# Patient Record
Sex: MALE | Race: WHITE | NOT HISPANIC OR LATINO | Employment: OTHER | URBAN - METROPOLITAN AREA
[De-identification: names, ages, dates, MRNs, and addresses within clinical notes are randomized per-mention and may not be internally consistent; named-entity substitution may affect disease eponyms.]

---

## 2022-04-14 ENCOUNTER — HOSPITAL ENCOUNTER (INPATIENT)
Facility: HOSPITAL | Age: 72
LOS: 2 days | Discharge: HOME | End: 2022-04-16
Attending: EMERGENCY MEDICINE | Admitting: INTERNAL MEDICINE

## 2022-04-14 ENCOUNTER — APPOINTMENT (OUTPATIENT)
Dept: GENERAL RADIOLOGY | Facility: HOSPITAL | Age: 72
End: 2022-04-14

## 2022-04-14 DIAGNOSIS — R06.02 SHORTNESS OF BREATH: ICD-10-CM

## 2022-04-14 DIAGNOSIS — I44.2 COMPLETE HEART BLOCK: Primary | ICD-10-CM

## 2022-04-14 DIAGNOSIS — I50.9 ACUTE CONGESTIVE HEART FAILURE, UNSPECIFIED HEART FAILURE TYPE: ICD-10-CM

## 2022-04-14 DIAGNOSIS — I44.2 THIRD DEGREE HEART BLOCK: ICD-10-CM

## 2022-04-14 DIAGNOSIS — I16.0 HYPERTENSIVE URGENCY: ICD-10-CM

## 2022-04-14 LAB
ALBUMIN SERPL-MCNC: 4.4 G/DL (ref 3.5–5.2)
ALBUMIN/GLOB SERPL: 1.9 G/DL
ALP SERPL-CCNC: 65 U/L (ref 39–117)
ALT SERPL W P-5'-P-CCNC: 25 U/L (ref 1–41)
ANION GAP SERPL CALCULATED.3IONS-SCNC: 11.6 MMOL/L (ref 5–15)
AST SERPL-CCNC: 18 U/L (ref 1–40)
BACTERIA UR QL AUTO: NORMAL /HPF
BASOPHILS # BLD AUTO: 0.06 10*3/MM3 (ref 0–0.2)
BASOPHILS NFR BLD AUTO: 0.6 % (ref 0–1.5)
BILIRUB SERPL-MCNC: 0.6 MG/DL (ref 0–1.2)
BILIRUB UR QL STRIP: NEGATIVE
BUN SERPL-MCNC: 25 MG/DL (ref 8–23)
BUN/CREAT SERPL: 20.7 (ref 7–25)
CALCIUM SPEC-SCNC: 9.6 MG/DL (ref 8.6–10.5)
CHLORIDE SERPL-SCNC: 102 MMOL/L (ref 98–107)
CLARITY UR: CLEAR
CO2 SERPL-SCNC: 21.4 MMOL/L (ref 22–29)
COLOR UR: YELLOW
CREAT SERPL-MCNC: 1.21 MG/DL (ref 0.76–1.27)
DEPRECATED RDW RBC AUTO: 38.7 FL (ref 37–54)
EGFRCR SERPLBLD CKD-EPI 2021: 63.6 ML/MIN/1.73
EOSINOPHIL # BLD AUTO: 0.21 10*3/MM3 (ref 0–0.4)
EOSINOPHIL NFR BLD AUTO: 2.2 % (ref 0.3–6.2)
ERYTHROCYTE [DISTWIDTH] IN BLOOD BY AUTOMATED COUNT: 12.6 % (ref 12.3–15.4)
GLOBULIN UR ELPH-MCNC: 2.3 GM/DL
GLUCOSE BLDC GLUCOMTR-MCNC: 113 MG/DL (ref 70–130)
GLUCOSE SERPL-MCNC: 112 MG/DL (ref 65–99)
GLUCOSE UR STRIP-MCNC: NEGATIVE MG/DL
HCT VFR BLD AUTO: 36 % (ref 37.5–51)
HGB BLD-MCNC: 12.7 G/DL (ref 13–17.7)
HGB UR QL STRIP.AUTO: NEGATIVE
HYALINE CASTS UR QL AUTO: NORMAL /LPF
IMM GRANULOCYTES # BLD AUTO: 0.02 10*3/MM3 (ref 0–0.05)
IMM GRANULOCYTES NFR BLD AUTO: 0.2 % (ref 0–0.5)
INR PPP: 1.21 (ref 0.9–1.1)
KETONES UR QL STRIP: NEGATIVE
LEUKOCYTE ESTERASE UR QL STRIP.AUTO: ABNORMAL
LYMPHOCYTES # BLD AUTO: 3.14 10*3/MM3 (ref 0.7–3.1)
LYMPHOCYTES NFR BLD AUTO: 32.5 % (ref 19.6–45.3)
MAGNESIUM SERPL-MCNC: 1.8 MG/DL (ref 1.6–2.4)
MCH RBC QN AUTO: 30.9 PG (ref 26.6–33)
MCHC RBC AUTO-ENTMCNC: 35.3 G/DL (ref 31.5–35.7)
MCV RBC AUTO: 87.6 FL (ref 79–97)
MONOCYTES # BLD AUTO: 1.09 10*3/MM3 (ref 0.1–0.9)
MONOCYTES NFR BLD AUTO: 11.3 % (ref 5–12)
NEUTROPHILS NFR BLD AUTO: 5.15 10*3/MM3 (ref 1.7–7)
NEUTROPHILS NFR BLD AUTO: 53.2 % (ref 42.7–76)
NITRITE UR QL STRIP: NEGATIVE
NRBC BLD AUTO-RTO: 0 /100 WBC (ref 0–0.2)
NT-PROBNP SERPL-MCNC: 2432 PG/ML (ref 0–900)
PH UR STRIP.AUTO: 5.5 [PH] (ref 5–8)
PLATELET # BLD AUTO: 234 10*3/MM3 (ref 140–450)
PMV BLD AUTO: 10.6 FL (ref 6–12)
POTASSIUM SERPL-SCNC: 4.2 MMOL/L (ref 3.5–5.2)
PROT SERPL-MCNC: 6.7 G/DL (ref 6–8.5)
PROT UR QL STRIP: NEGATIVE
PROTHROMBIN TIME: 15.2 SECONDS (ref 11.7–14.2)
QT INTERVAL: 657 MS
QT INTERVAL: 680 MS
RBC # BLD AUTO: 4.11 10*6/MM3 (ref 4.14–5.8)
RBC # UR STRIP: NORMAL /HPF
REF LAB TEST METHOD: NORMAL
SARS-COV-2 RNA PNL SPEC NAA+PROBE: NOT DETECTED
SODIUM SERPL-SCNC: 135 MMOL/L (ref 136–145)
SP GR UR STRIP: 1.01 (ref 1–1.03)
SQUAMOUS #/AREA URNS HPF: NORMAL /HPF
TROPONIN T SERPL-MCNC: <0.01 NG/ML (ref 0–0.03)
TSH SERPL DL<=0.05 MIU/L-ACNC: 2.89 UIU/ML (ref 0.27–4.2)
UROBILINOGEN UR QL STRIP: ABNORMAL
WBC # UR STRIP: NORMAL /HPF
WBC NRBC COR # BLD: 9.67 10*3/MM3 (ref 3.4–10.8)

## 2022-04-14 PROCEDURE — 25010000002 FUROSEMIDE PER 20 MG: Performed by: EMERGENCY MEDICINE

## 2022-04-14 PROCEDURE — 83735 ASSAY OF MAGNESIUM: CPT | Performed by: EMERGENCY MEDICINE

## 2022-04-14 PROCEDURE — 33210 INSERT ELECTRD/PM CATH SNGL: CPT | Performed by: INTERNAL MEDICINE

## 2022-04-14 PROCEDURE — 99222 1ST HOSP IP/OBS MODERATE 55: CPT | Performed by: INTERNAL MEDICINE

## 2022-04-14 PROCEDURE — 81001 URINALYSIS AUTO W/SCOPE: CPT | Performed by: EMERGENCY MEDICINE

## 2022-04-14 PROCEDURE — C1894 INTRO/SHEATH, NON-LASER: HCPCS | Performed by: INTERNAL MEDICINE

## 2022-04-14 PROCEDURE — 82962 GLUCOSE BLOOD TEST: CPT

## 2022-04-14 PROCEDURE — 36415 COLL VENOUS BLD VENIPUNCTURE: CPT

## 2022-04-14 PROCEDURE — 85025 COMPLETE CBC W/AUTO DIFF WBC: CPT | Performed by: EMERGENCY MEDICINE

## 2022-04-14 PROCEDURE — 99152 MOD SED SAME PHYS/QHP 5/>YRS: CPT | Performed by: INTERNAL MEDICINE

## 2022-04-14 PROCEDURE — 71045 X-RAY EXAM CHEST 1 VIEW: CPT

## 2022-04-14 PROCEDURE — 80053 COMPREHEN METABOLIC PANEL: CPT | Performed by: EMERGENCY MEDICINE

## 2022-04-14 PROCEDURE — 94799 UNLISTED PULMONARY SVC/PX: CPT

## 2022-04-14 PROCEDURE — 93005 ELECTROCARDIOGRAM TRACING: CPT | Performed by: EMERGENCY MEDICINE

## 2022-04-14 PROCEDURE — 93010 ELECTROCARDIOGRAM REPORT: CPT | Performed by: INTERNAL MEDICINE

## 2022-04-14 PROCEDURE — 25010000002 LORAZEPAM PER 2 MG: Performed by: EMERGENCY MEDICINE

## 2022-04-14 PROCEDURE — 85610 PROTHROMBIN TIME: CPT | Performed by: EMERGENCY MEDICINE

## 2022-04-14 PROCEDURE — 87635 SARS-COV-2 COVID-19 AMP PRB: CPT | Performed by: EMERGENCY MEDICINE

## 2022-04-14 PROCEDURE — 25010000002 ATROPINE SULFATE: Performed by: EMERGENCY MEDICINE

## 2022-04-14 PROCEDURE — 25010000002 HYDRALAZINE PER 20 MG: Performed by: EMERGENCY MEDICINE

## 2022-04-14 PROCEDURE — 84443 ASSAY THYROID STIM HORMONE: CPT | Performed by: EMERGENCY MEDICINE

## 2022-04-14 PROCEDURE — 25010000002 FENTANYL CITRATE (PF) 50 MCG/ML SOLUTION: Performed by: INTERNAL MEDICINE

## 2022-04-14 PROCEDURE — 99291 CRITICAL CARE FIRST HOUR: CPT

## 2022-04-14 PROCEDURE — 84484 ASSAY OF TROPONIN QUANT: CPT | Performed by: EMERGENCY MEDICINE

## 2022-04-14 PROCEDURE — 25010000002 MIDAZOLAM PER 1 MG: Performed by: INTERNAL MEDICINE

## 2022-04-14 PROCEDURE — 94660 CPAP INITIATION&MGMT: CPT

## 2022-04-14 PROCEDURE — 83880 ASSAY OF NATRIURETIC PEPTIDE: CPT | Performed by: EMERGENCY MEDICINE

## 2022-04-14 RX ORDER — LORAZEPAM 2 MG/ML
1 INJECTION INTRAMUSCULAR ONCE
Status: COMPLETED | OUTPATIENT
Start: 2022-04-14 | End: 2022-04-14

## 2022-04-14 RX ORDER — PROMETHAZINE HYDROCHLORIDE 25 MG/1
12.5 TABLET ORAL EVERY 6 HOURS PRN
Status: DISCONTINUED | OUTPATIENT
Start: 2022-04-14 | End: 2022-04-16 | Stop reason: HOSPADM

## 2022-04-14 RX ORDER — CHOLECALCIFEROL (VITAMIN D3) 125 MCG
5 CAPSULE ORAL NIGHTLY PRN
Status: DISCONTINUED | OUTPATIENT
Start: 2022-04-14 | End: 2022-04-16 | Stop reason: HOSPADM

## 2022-04-14 RX ORDER — ONDANSETRON 4 MG/1
4 TABLET, FILM COATED ORAL EVERY 6 HOURS PRN
Status: DISCONTINUED | OUTPATIENT
Start: 2022-04-14 | End: 2022-04-16 | Stop reason: HOSPADM

## 2022-04-14 RX ORDER — VALSARTAN 160 MG/1
160 TABLET ORAL
Status: DISCONTINUED | OUTPATIENT
Start: 2022-04-14 | End: 2022-04-16 | Stop reason: HOSPADM

## 2022-04-14 RX ORDER — LIDOCAINE HYDROCHLORIDE 20 MG/ML
INJECTION, SOLUTION INFILTRATION; PERINEURAL AS NEEDED
Status: DISCONTINUED | OUTPATIENT
Start: 2022-04-14 | End: 2022-04-14 | Stop reason: HOSPADM

## 2022-04-14 RX ORDER — SODIUM CHLORIDE 9 MG/ML
INJECTION, SOLUTION INTRAVENOUS CONTINUOUS PRN
Status: COMPLETED | OUTPATIENT
Start: 2022-04-14 | End: 2022-04-14

## 2022-04-14 RX ORDER — ACETAMINOPHEN 650 MG/1
650 SUPPOSITORY RECTAL EVERY 4 HOURS PRN
Status: DISCONTINUED | OUTPATIENT
Start: 2022-04-14 | End: 2022-04-16 | Stop reason: HOSPADM

## 2022-04-14 RX ORDER — MAGNESIUM SULFATE HEPTAHYDRATE 40 MG/ML
2 INJECTION, SOLUTION INTRAVENOUS AS NEEDED
Status: DISCONTINUED | OUTPATIENT
Start: 2022-04-14 | End: 2022-04-16 | Stop reason: HOSPADM

## 2022-04-14 RX ORDER — CALCIUM GLUCONATE 20 MG/ML
1 INJECTION, SOLUTION INTRAVENOUS AS NEEDED
Status: DISCONTINUED | OUTPATIENT
Start: 2022-04-14 | End: 2022-04-16 | Stop reason: HOSPADM

## 2022-04-14 RX ORDER — SODIUM CHLORIDE 0.9 % (FLUSH) 0.9 %
10 SYRINGE (ML) INJECTION EVERY 12 HOURS SCHEDULED
Status: DISCONTINUED | OUTPATIENT
Start: 2022-04-14 | End: 2022-04-16 | Stop reason: HOSPADM

## 2022-04-14 RX ORDER — NICOTINE POLACRILEX 4 MG
15 LOZENGE BUCCAL
Status: DISCONTINUED | OUTPATIENT
Start: 2022-04-14 | End: 2022-04-16 | Stop reason: HOSPADM

## 2022-04-14 RX ORDER — FUROSEMIDE 10 MG/ML
80 INJECTION INTRAMUSCULAR; INTRAVENOUS ONCE
Status: COMPLETED | OUTPATIENT
Start: 2022-04-14 | End: 2022-04-14

## 2022-04-14 RX ORDER — SODIUM CHLORIDE 0.9 % (FLUSH) 0.9 %
10 SYRINGE (ML) INJECTION AS NEEDED
Status: DISCONTINUED | OUTPATIENT
Start: 2022-04-14 | End: 2022-04-16 | Stop reason: HOSPADM

## 2022-04-14 RX ORDER — POTASSIUM CHLORIDE 750 MG/1
40 TABLET, FILM COATED, EXTENDED RELEASE ORAL AS NEEDED
Status: DISCONTINUED | OUTPATIENT
Start: 2022-04-14 | End: 2022-04-16 | Stop reason: HOSPADM

## 2022-04-14 RX ORDER — ONDANSETRON 2 MG/ML
4 INJECTION INTRAMUSCULAR; INTRAVENOUS EVERY 6 HOURS PRN
Status: DISCONTINUED | OUTPATIENT
Start: 2022-04-14 | End: 2022-04-16 | Stop reason: HOSPADM

## 2022-04-14 RX ORDER — ACETAMINOPHEN 325 MG/1
650 TABLET ORAL EVERY 4 HOURS PRN
Status: DISCONTINUED | OUTPATIENT
Start: 2022-04-14 | End: 2022-04-14 | Stop reason: SDUPTHER

## 2022-04-14 RX ORDER — POTASSIUM CHLORIDE 1.5 G/1.77G
40 POWDER, FOR SOLUTION ORAL AS NEEDED
Status: DISCONTINUED | OUTPATIENT
Start: 2022-04-14 | End: 2022-04-16 | Stop reason: HOSPADM

## 2022-04-14 RX ORDER — ACETAMINOPHEN 325 MG/1
650 TABLET ORAL EVERY 4 HOURS PRN
Status: DISCONTINUED | OUTPATIENT
Start: 2022-04-14 | End: 2022-04-16 | Stop reason: HOSPADM

## 2022-04-14 RX ORDER — DEXTROSE MONOHYDRATE 25 G/50ML
25 INJECTION, SOLUTION INTRAVENOUS
Status: DISCONTINUED | OUTPATIENT
Start: 2022-04-14 | End: 2022-04-16 | Stop reason: HOSPADM

## 2022-04-14 RX ORDER — FENTANYL CITRATE 50 UG/ML
INJECTION, SOLUTION INTRAMUSCULAR; INTRAVENOUS AS NEEDED
Status: DISCONTINUED | OUTPATIENT
Start: 2022-04-14 | End: 2022-04-14 | Stop reason: HOSPADM

## 2022-04-14 RX ORDER — HYDROCODONE BITARTRATE AND ACETAMINOPHEN 5; 325 MG/1; MG/1
1 TABLET ORAL EVERY 4 HOURS PRN
Status: DISCONTINUED | OUTPATIENT
Start: 2022-04-14 | End: 2022-04-15 | Stop reason: SDUPTHER

## 2022-04-14 RX ORDER — MAGNESIUM SULFATE HEPTAHYDRATE 40 MG/ML
4 INJECTION, SOLUTION INTRAVENOUS AS NEEDED
Status: DISCONTINUED | OUTPATIENT
Start: 2022-04-14 | End: 2022-04-16 | Stop reason: HOSPADM

## 2022-04-14 RX ORDER — AMLODIPINE BESYLATE 5 MG/1
5 TABLET ORAL
Status: DISCONTINUED | OUTPATIENT
Start: 2022-04-14 | End: 2022-04-16 | Stop reason: HOSPADM

## 2022-04-14 RX ORDER — POTASSIUM CHLORIDE 7.45 MG/ML
10 INJECTION INTRAVENOUS
Status: DISCONTINUED | OUTPATIENT
Start: 2022-04-14 | End: 2022-04-16 | Stop reason: HOSPADM

## 2022-04-14 RX ORDER — MIDAZOLAM HYDROCHLORIDE 1 MG/ML
INJECTION INTRAMUSCULAR; INTRAVENOUS AS NEEDED
Status: DISCONTINUED | OUTPATIENT
Start: 2022-04-14 | End: 2022-04-14 | Stop reason: HOSPADM

## 2022-04-14 RX ORDER — HYDRALAZINE HYDROCHLORIDE 20 MG/ML
20 INJECTION INTRAMUSCULAR; INTRAVENOUS ONCE
Status: COMPLETED | OUTPATIENT
Start: 2022-04-14 | End: 2022-04-14

## 2022-04-14 RX ADMIN — ATROPINE SULFATE 0.5 MG: 0.1 INJECTION INTRAVENOUS at 17:04

## 2022-04-14 RX ADMIN — FUROSEMIDE 80 MG: 10 INJECTION, SOLUTION INTRAMUSCULAR; INTRAVENOUS at 18:46

## 2022-04-14 RX ADMIN — LORAZEPAM 1 MG: 2 INJECTION INTRAMUSCULAR; INTRAVENOUS at 18:46

## 2022-04-14 RX ADMIN — Medication 10 ML: at 23:37

## 2022-04-14 RX ADMIN — HYDRALAZINE HYDROCHLORIDE 20 MG: 20 INJECTION INTRAMUSCULAR; INTRAVENOUS at 17:59

## 2022-04-14 NOTE — ED PROVIDER NOTES
EMERGENCY DEPARTMENT ENCOUNTER    Room Number:  Pool/CATH  Date of encounter:  4/14/2022  PCP: Felecia Card PA  Historian: Patient and EMS      HPI:  Chief Complaint: Short of breath        Context: Reginald Roth is a 72 y.o. male who presents to the ED c/o short of breath and low heart rate.  Patient states he was admitted to [facility] at the end of January for dizziness and had a negative work-up at that time including a stress test and CT of his chest.  He states that he was seen by pulmonary and diagnosed with obstructive sleep apnea in February.  He states over the past 10 days he has had progressive swelling in his legs and shortness of breath so went to see his PCP in [facility] today who noted him to have a heart rate of 30 and thus called 911 and the patient was brought to [facility] for further evaluation.  Upon arrival the patient's main complaint is shortness of breath.  He denies chest pain, palpitations or dizziness.  The patient is unsure of what medications he takes.  He denies prior cardiac history.  He states he is a non-smoker and has no history of COPD that he is aware of.      PAST MEDICAL HISTORY  Active Ambulatory Problems     Diagnosis Date Noted   • No Active Ambulatory Problems     Resolved Ambulatory Problems     Diagnosis Date Noted   • No Resolved Ambulatory Problems     No Additional Past Medical History         PAST SURGICAL HISTORY  No past surgical history on file.      FAMILY HISTORY  No family history on file.      SOCIAL HISTORY  Social History     Socioeconomic History   • Marital status:          ALLERGIES  Patient has no allergy information on record.        REVIEW OF SYSTEMS  Review of Systems     Patient denies headache, neck pain, chest pain, back pain, abdominal pain, vomiting, diarrhea or focal neuro deficit  All systems reviewed and negative except for those discussed in HPI.     PHYSICAL EXAM    I have reviewed the triage vital signs and nursing  notes.    ED Triage Vitals   Temp Heart Rate Resp BP SpO2   04/14/22 1703 04/14/22 1654 04/14/22 1702 04/14/22 1654 04/14/22 1654   98.4 °F (36.9 °C) (!) 38 22 (!) 193/68 90 %      Temp src Heart Rate Source Patient Position BP Location FiO2 (%)   -- -- -- -- --              GENERAL: 72-year-old well developed, well nourished in mild respiratory distress  HENT: NCAT, neck supple, trachea midline  EYES: no scleral icterus, PERRL, normal conjunctivae  CV: regular rhythm, with a heart rate in the 30s and 2/6 murmur  RESPIRATORY: Mild respiratory distress with Rales in bases  ABDOMEN: soft, nontender, nondistended, bowel sounds present  MUSCULOSKELETAL: no gross deformity, 2+ pedal edema, no calf tenderness  NEURO: alert,  sensory and motor function of extremities intact, speech clear, mental status normal  SKIN: warm, dry, no rash  PSYCH:  Appropriate mood and affect      PPE  Pt does not present with symptoms for COVID19; however, I was wearing a N95 mask and goggles throughout all patient interaction.    Vital signs and nursing notes reviewed.      LAB RESULTS  Recent Results (from the past 24 hour(s))   ECG 12 Lead    Collection Time: 04/14/22  4:56 PM   Result Value Ref Range    QT Interval 657 ms   Comprehensive Metabolic Panel    Collection Time: 04/14/22  5:05 PM    Specimen: Blood   Result Value Ref Range    Glucose 112 (H) 65 - 99 mg/dL    BUN 25 (H) 8 - 23 mg/dL    Creatinine 1.21 0.76 - 1.27 mg/dL    Sodium 135 (L) 136 - 145 mmol/L    Potassium 4.2 3.5 - 5.2 mmol/L    Chloride 102 98 - 107 mmol/L    CO2 21.4 (L) 22.0 - 29.0 mmol/L    Calcium 9.6 8.6 - 10.5 mg/dL    Total Protein 6.7 6.0 - 8.5 g/dL    Albumin 4.40 3.50 - 5.20 g/dL    ALT (SGPT) 25 1 - 41 U/L    AST (SGOT) 18 1 - 40 U/L    Alkaline Phosphatase 65 39 - 117 U/L    Total Bilirubin 0.6 0.0 - 1.2 mg/dL    Globulin 2.3 gm/dL    A/G Ratio 1.9 g/dL    BUN/Creatinine Ratio 20.7 7.0 - 25.0    Anion Gap 11.6 5.0 - 15.0 mmol/L    eGFR 63.6 >60.0  mL/min/1.73   Protime-INR    Collection Time: 04/14/22  5:05 PM    Specimen: Blood   Result Value Ref Range    Protime 15.2 (H) 11.7 - 14.2 Seconds    INR 1.21 (H) 0.90 - 1.10   BNP    Collection Time: 04/14/22  5:05 PM    Specimen: Blood   Result Value Ref Range    proBNP 2,432.0 (H) 0.0 - 900.0 pg/mL   Troponin    Collection Time: 04/14/22  5:05 PM    Specimen: Blood   Result Value Ref Range    Troponin T <0.010 0.000 - 0.030 ng/mL   Magnesium    Collection Time: 04/14/22  5:05 PM    Specimen: Blood   Result Value Ref Range    Magnesium 1.8 1.6 - 2.4 mg/dL   TSH    Collection Time: 04/14/22  5:05 PM    Specimen: Blood   Result Value Ref Range    TSH 2.890 0.270 - 4.200 uIU/mL   CBC Auto Differential    Collection Time: 04/14/22  5:05 PM    Specimen: Blood   Result Value Ref Range    WBC 9.67 3.40 - 10.80 10*3/mm3    RBC 4.11 (L) 4.14 - 5.80 10*6/mm3    Hemoglobin 12.7 (L) 13.0 - 17.7 g/dL    Hematocrit 36.0 (L) 37.5 - 51.0 %    MCV 87.6 79.0 - 97.0 fL    MCH 30.9 26.6 - 33.0 pg    MCHC 35.3 31.5 - 35.7 g/dL    RDW 12.6 12.3 - 15.4 %    RDW-SD 38.7 37.0 - 54.0 fl    MPV 10.6 6.0 - 12.0 fL    Platelets 234 140 - 450 10*3/mm3    Neutrophil % 53.2 42.7 - 76.0 %    Lymphocyte % 32.5 19.6 - 45.3 %    Monocyte % 11.3 5.0 - 12.0 %    Eosinophil % 2.2 0.3 - 6.2 %    Basophil % 0.6 0.0 - 1.5 %    Immature Grans % 0.2 0.0 - 0.5 %    Neutrophils, Absolute 5.15 1.70 - 7.00 10*3/mm3    Lymphocytes, Absolute 3.14 (H) 0.70 - 3.10 10*3/mm3    Monocytes, Absolute 1.09 (H) 0.10 - 0.90 10*3/mm3    Eosinophils, Absolute 0.21 0.00 - 0.40 10*3/mm3    Basophils, Absolute 0.06 0.00 - 0.20 10*3/mm3    Immature Grans, Absolute 0.02 0.00 - 0.05 10*3/mm3    nRBC 0.0 0.0 - 0.2 /100 WBC   COVID-19,[facility] IN-HOUSE CEPHEID/FLORENCIO NP SWAB IN TRANSPORT MEDIA 8-12 HR TAT - Swab, Nasopharynx    Collection Time: 04/14/22  5:06 PM    Specimen: Nasopharynx; Swab   Result Value Ref Range    COVID19 Not Detected Not Detected - Ref. Range   Urinalysis With  Microscopic If Indicated (No Culture) - Urine, Clean Catch    Collection Time: 04/14/22  5:12 PM    Specimen: Urine, Clean Catch   Result Value Ref Range    Color, UA Yellow Yellow, Straw    Appearance, UA Clear Clear    pH, UA 5.5 5.0 - 8.0    Specific Gravity, UA 1.006 1.005 - 1.030    Glucose, UA Negative Negative    Ketones, UA Negative Negative    Bilirubin, UA Negative Negative    Blood, UA Negative Negative    Protein, UA Negative Negative    Leuk Esterase, UA Trace (A) Negative    Nitrite, UA Negative Negative    Urobilinogen, UA 0.2 E.U./dL 0.2 - 1.0 E.U./dL   Urinalysis, Microscopic Only - Urine, Clean Catch    Collection Time: 04/14/22  5:12 PM    Specimen: Urine, Clean Catch   Result Value Ref Range    RBC, UA 0-2 None Seen, 0-2 /HPF    WBC, UA 0-2 None Seen, 0-2 /HPF    Bacteria, UA None Seen None Seen /HPF    Squamous Epithelial Cells, UA 0-2 None Seen, 0-2 /HPF    Hyaline Casts, UA None Seen None Seen /LPF    Methodology Automated Microscopy    ECG 12 Lead    Collection Time: 04/14/22  6:35 PM   Result Value Ref Range    QT Interval 680 ms       Ordered the above labs and independently reviewed the results.        RADIOLOGY  XR Chest 1 View    Result Date: 4/14/2022  PORTABLE CHEST 04/14/2022 AT 5:24 PM  CLINICAL HISTORY: Dyspnea. Bradycardia.  The lungs are somewhat poorly inflated. There is ill-defined increased density in both lower lung zones that is primarily due to the low lung volumes and overlying soft tissue. However, mild pulmonary vascular congestion is also evident.. No focal areas of consolidation are identified. No pleural effusions are evident. The heart is within normal limits in size.  IMPRESSIONS: Poor lung expansion. Mild vascular congestion.  This report was finalized on 4/14/2022 5:58 PM by Dr. Bud Cortez M.D.        I ordered the above noted radiological studies. Independently reviewed by me and discussed with radiologist.  See dictation above for official radiology  interpretation.      PROCEDURES    Critical Care  Performed by: Mele Watt MD  Authorized by: Mele Watt MD     Critical care provider statement:     Critical care time (minutes):  55    Critical care time was exclusive of:  Separately billable procedures and treating other patients    Critical care was necessary to treat or prevent imminent or life-threatening deterioration of the following conditions:  Cardiac failure, circulatory failure and respiratory failure    Critical care was time spent personally by me on the following activities:  Development of treatment plan with patient or surrogate, discussions with consultants, discussions with primary provider, evaluation of patient's response to treatment, examination of patient, obtaining history from patient or surrogate, ordering and performing treatments and interventions, ordering and review of laboratory studies, ordering and review of radiographic studies, pulse oximetry, re-evaluation of patient's condition and review of old charts    I assumed direction of critical care for this patient from another provider in my specialty: no              MEDICATIONS GIVEN IN ER    Medications   sodium chloride 0.9 % flush 10 mL ( Intravenous MAR Hold 4/14/22 1935)   atropine sulfate injection 0.5 mg (0.5 mg Intravenous Given 4/14/22 1704)   hydrALAZINE (APRESOLINE) injection 20 mg (20 mg Intravenous Given 4/14/22 1759)   furosemide (LASIX) injection 80 mg (80 mg Intravenous Given 4/14/22 1846)   LORazepam (ATIVAN) injection 1 mg (1 mg Intravenous Given 4/14/22 1846)         PROGRESS, DATA ANALYSIS, CONSULTS, AND MEDICAL DECISION MAKING    All labs have been independently reviewed by me.  All radiology studies have been reviewed by me and discussed with radiologist dictating report.   EKG's independently reviewed by me.  Discussion below represents my analysis of pertinent findings related to patient's condition, differential diagnosis, treatment plan and  final disposition.      ED Course as of 04/14/22 1938   u Apr 14, 2022 1709 The patient presents to our ER via EMS in third-degree heart block but has a stable blood pressure and is alert and mentating at this time.  His primary complaint is shortness of breath and his room air oxygen saturations are 90% and thus will place him on 2 L.  I will give the patient a dose of atropine and consult cardiology in regards the patient's third-degree heart block.  We have placed the pacing pads on the patient [GP]   1710 EKG    EKG time: 1656  Rhythm/Rate: Third-degree heart block with a rate of 33  Right bundle branch block  No Acute Ischemia  Non-Specific ST-T changes  No old EKG for comparison    Interpreted Contemporaneously by me.  Independently viewed by me     [GP]   1721 I discussed the case with Dr. Munoz from cardiology.  He will consult the interventional cardiologist about pacemaker placement.  He has asked me to give the patient a dose of 20 mg of hydralazine for his blood pressure. [GP]   1805 My interpretation the patient's chest x-ray is CHF.  I will give him a dose of Lasix.  The patient's electrolytes and troponin are negative.  I will call Dr. Munoz back. [GP]   1818 On repeat examination the patient's blood pressure is 168/75, his heart rate is 36 and his oxygen saturations on 3 L are 93%. [GP]   1827 I discussed the case with Dr. Munoz again who has spoken with Dr. Calles who will come and see the patient in the emergency room. [GP]   1838 The patient is now starting to feel dizzy.  His blood pressure is 163/88 and his heart rate is 36.  Dr. Calles is currently in the room with the patient.  A repeat EKG was performed which was shown to Dr. Calles and unchanged from earlier today [GP]   1853 I have ordered the patient Lasix and Ativan and obtained a repeat EKG.  Dr. Calles has called in the Cath Lab in order to place a pacemaker emergently. [GP]      ED Course User Index  [GP] Mele Watt MD            The differential diagnosis includes but is not limited to premature ventricular contractions, premature atrial contractions, supraventricular tachycardia, atrial fibrillation, atrial flutter, or sinus arrhythmia.        AS OF 19:38 EDT VITALS:    BP - 170/59  HR - (!) 39  TEMP - 98.4 °F (36.9 °C)  02 SATS - 95%        DIAGNOSIS  Final diagnoses:   Third degree heart block (HCC)   Acute congestive heart failure, unspecified heart failure type (HCC)   Shortness of breath   Hypertensive urgency         DISPOSITION  Emergently to the Cath Lab        EMR Dragon/Transcription disclaimer:   Much of this encounter note is an electronic transcription/translation of spoken language to printed text.        Mele Watt MD  04/14/22 1939

## 2022-04-14 NOTE — H&P
[facility] History And Physical Assessment    Patient Name: Reginald Roth  Age/Sex: 72 y.o. male  : 1950  MRN: [id number]    Date of Hospital Admission: 2022  Date of Encounter Visit: 22  Encounter Provider: Madelaine Calles MD  Place of Service: [facility] CARDIOLOGY  Patient Care Team:  Felecia Card PA as PCP - General (Internal Medicine)    Subjective:     Chief Complaint:  Dyspnea, lightheadedness, near-syncope    History of Present Illness:  Reginald Roth is a 72 y.o. male with hypertension, hyperlipidemia, diabetes mellitus type 2, obesity, obstructive sleep apnea on CPAP, who presents with complete heart block.    The patient presented to the emergency room with complaints of shortness of breath, dizziness and low heart rate.  He apparently presented with similar complaints of shortness of breath dizziness, near-syncope and syncope back in 2022.  He was in [facility] at that time.  His work-up included a CT of the chest that was unremarkable.  He underwent a cardiac work-up with an echocardiogram and a stress test which were both unremarkable.  Since then he has been diagnosed with obstructive sleep apnea and started on CPAP.    Over the last 10 days he reported increasing dyspnea on exertion along with lower extremity edema.  He was evaluated by his primary care physician earlier today and was noted to have heart rates in the 30s.  EMS was called and he was brought to [facility] [place] operated by [facility] for further evaluation.  He reports shortness of breath both with activity and at rest.  On his arrival he was noted to be in complete heart block with heart rates in the 30s.  Blood pressures have been elevated with systolics in the 160s to 170s.  He was satting in the low 90s on arrival and was placed on 2 L nasal cannula.  Initially he appeared to be tolerating the complete heart block very well but then began complaining of worsening  shortness of breath, lightheadedness, and feeling like he cannot really focus.  He otherwise denies any chest discomfort.  He had some near-syncope yesterday but no syncope recently.    The remainder of his work-up was significant for an elevated BNP of 2432, mildly low sodium of 135, and hemoglobin of 12.7.  His troponin, remainder of electrolytes and TSH are normal.  Chest x-ray does show evidence of increased pulmonary vascular congestion.  COVID-19 test is negative.    Past Medical History:  1.  Diabetes mellitus type 2  2.  Hypertension  3.  Hyperlipidemia  4.  Obstructive sleep apnea compliant with CPAP  5.  Obesity    Past surgical history  Right shoulder replacement  Left rotator cuff repair  Colon surgery    Home Medications (obtained from last admission at [facility] in January):   Amlodipine 5 mg daily  Atorvastatin 20 mg daily  Finasteride 5 mg daily  Hydrochlorothiazide 25 mg daily  Valsartan 160 mg daily    Allergies:  No known drug allergies    Past Social History:  Social History     Socioeconomic History   • Marital status:    No tobacco use    Past Family History:  No relevant family history    Review of Systems:   All systems reviewed. Pertinent positives identified in HPI. All other systems are negative.    Objective:   Temp:  [98.4 °F (36.9 °C)] 98.4 °F (36.9 °C)  Heart Rate:  [] 39  Resp:  [22] 22  BP: (163-193)/(59-76) 170/59  No intake or output data in the 24 hours ending 04/14/22 1851  Body mass index is 35.58 kg/m².      04/14/22  1702   Weight: 106 kg (234 lb)     Weight change:     Physical Exam:   General Appearance:    Alert, appears unwell   Head:    Normocephalic, without obvious abnormality, atraumatic   Eyes:            Lids and lashes normal, conjunctivae and sclerae normal, no   icterus, no pallor, corneas clear, PERRLA   Ears:    Ears appear intact with no abnormalities noted   Neck:   No adenopathy, supple, trachea midline, no thyromegaly, no   carotid bruit, no JVD    Lungs:     Clear to auscultation,respirations regular, even and unlabored    Heart:    Jovanni, regular rhythm and normal rate, normal S1 and S2, no murmur, no gallop, no rub, no click   Chest Wall:    No abnormalities observed   Abdomen:     Normal bowel sounds, no masses, no organomegaly, soft        non-tender, non-distended, no guarding, no rebound  tenderness   Extremities:   Moves all extremities well, no edema, no cyanosis, no redness   Pulses:   Pulses palpable and equal bilaterally. Normal radial, carotid, femoral, dorsalis pedis and posterior tibial pulses bilaterally. Normal abdominal aorta   Skin:  Psychiatric:   No bleeding, bruising or rash    Alert and oriented x 3, normal mood and affect         Lab Review:   Results from last 7 days   Lab Units 04/14/22  1705   SODIUM mmol/L 135*   POTASSIUM mmol/L 4.2   CHLORIDE mmol/L 102   CO2 mmol/L 21.4*   BUN mg/dL 25*   CREATININE mg/dL 1.21   GLUCOSE mg/dL 112*   CALCIUM mg/dL 9.6   AST (SGOT) U/L 18   ALT (SGPT) U/L 25     Results from last 7 days   Lab Units 04/14/22  1705   TROPONIN T ng/mL <0.010     Results from last 7 days   Lab Units 04/14/22  1705   WBC 10*3/mm3 9.67   HEMOGLOBIN g/dL 12.7*   HEMATOCRIT % 36.0*   PLATELETS 10*3/mm3 234     Results from last 7 days   Lab Units 04/14/22  1705   INR  1.21*     Results from last 7 days   Lab Units 04/14/22  1705   MAGNESIUM mg/dL 1.8           Invalid input(s): LDLCALC  Results from last 7 days   Lab Units 04/14/22  1705   PROBNP pg/mL 2,432.0*     Results from last 7 days   Lab Units 04/14/22  1705   TSH uIU/mL 2.890       Imaging:  Imaging Results (Most Recent)     Procedure Component Value Units Date/Time    XR Chest 1 View [[id number]] Collected: 04/14/22 1757     Updated: 04/14/22 1801    Narrative:      PORTABLE CHEST 04/14/2022 AT 5:24 PM     CLINICAL HISTORY: Dyspnea. Bradycardia.     The lungs are somewhat poorly inflated. There is ill-defined increased  density in both lower lung zones that is  primarily due to the low lung  volumes and overlying soft tissue. However, mild pulmonary vascular  congestion is also evident.. No focal areas of consolidation are  identified. No pleural effusions are evident. The heart is within normal  limits in size.     IMPRESSIONS: Poor lung expansion. Mild vascular congestion.     This report was finalized on 4/14/2022 5:58 PM by Dr. Bud Cortez M.D.           I personally viewed and interpreted the patient's EKG    Assessment/Plan:     1. Complete heart block.  Symptomatic.    2. Acute congestive heart failure. Likely due to #1.  Recent echo at [facility] in 1/2022 showed normal left ventricular systolic function.  Just received an IV dose of furosemide.  Suspect this is related to his complete heart block.  3. Hypertension.  Elevated here likely compensatory due to heart block.   4. Hyperlipidemia  5. Diabetes mellitus type 2    -Agree with IV diuresis now.  -We will proceed with temporary pacer wire placement tonight.  -Monitor overnight in the coronary care unit.  -We will reassess tomorrow for permanent pacemaker placement.    Madelaine Calles MD  04/14/22  18:51 EDT    ADDENDUM:  Temporary pacer wire placed successfully in the cardiac catheterization laboratory.  Patient did develop progressive hypoxia and required nonrebreather placement during the procedure.  He was unable to urinate despite the IV furosemide 80 mg administered prior to arriving to the cardiac catheterization laboratory.  Heath catheter was placed before leaving the catheterization laboratory and he had already put out 500 mL of urine prior to transfer.  Monitor for further response from his furosemide dosage from earlier this evening.  Repeat dosing as needed.  Consult pulmonary to assist with his hypoxic respiratory failure.  Keep temporary wire in place and continue supportive care.  Reevaluate in the morning for permanent pacemaker placement.

## 2022-04-15 LAB
ANION GAP SERPL CALCULATED.3IONS-SCNC: 12.3 MMOL/L (ref 5–15)
BUN SERPL-MCNC: 26 MG/DL (ref 8–23)
BUN/CREAT SERPL: 19.5 (ref 7–25)
CALCIUM SPEC-SCNC: 9.5 MG/DL (ref 8.6–10.5)
CHLORIDE SERPL-SCNC: 103 MMOL/L (ref 98–107)
CO2 SERPL-SCNC: 23.7 MMOL/L (ref 22–29)
CREAT SERPL-MCNC: 1.33 MG/DL (ref 0.76–1.27)
DEPRECATED RDW RBC AUTO: 41 FL (ref 37–54)
EGFRCR SERPLBLD CKD-EPI 2021: 56.8 ML/MIN/1.73
ERYTHROCYTE [DISTWIDTH] IN BLOOD BY AUTOMATED COUNT: 12.6 % (ref 12.3–15.4)
GLUCOSE BLDC GLUCOMTR-MCNC: 126 MG/DL (ref 70–130)
GLUCOSE BLDC GLUCOMTR-MCNC: 128 MG/DL (ref 70–130)
GLUCOSE BLDC GLUCOMTR-MCNC: 144 MG/DL (ref 70–130)
GLUCOSE SERPL-MCNC: 135 MG/DL (ref 65–99)
HCT VFR BLD AUTO: 37.2 % (ref 37.5–51)
HGB BLD-MCNC: 12.7 G/DL (ref 13–17.7)
MCH RBC QN AUTO: 30.6 PG (ref 26.6–33)
MCHC RBC AUTO-ENTMCNC: 34.1 G/DL (ref 31.5–35.7)
MCV RBC AUTO: 89.6 FL (ref 79–97)
PLATELET # BLD AUTO: 250 10*3/MM3 (ref 140–450)
PMV BLD AUTO: 10.6 FL (ref 6–12)
POTASSIUM SERPL-SCNC: 3.8 MMOL/L (ref 3.5–5.2)
RBC # BLD AUTO: 4.15 10*6/MM3 (ref 4.14–5.8)
SODIUM SERPL-SCNC: 139 MMOL/L (ref 136–145)
WBC NRBC COR # BLD: 9.29 10*3/MM3 (ref 3.4–10.8)

## 2022-04-15 PROCEDURE — 93005 ELECTROCARDIOGRAM TRACING: CPT | Performed by: INTERNAL MEDICINE

## 2022-04-15 PROCEDURE — 02H63JZ INSERTION OF PACEMAKER LEAD INTO RIGHT ATRIUM, PERCUTANEOUS APPROACH: ICD-10-PCS | Performed by: INTERNAL MEDICINE

## 2022-04-15 PROCEDURE — C1894 INTRO/SHEATH, NON-LASER: HCPCS | Performed by: INTERNAL MEDICINE

## 2022-04-15 PROCEDURE — 99232 SBSQ HOSP IP/OBS MODERATE 35: CPT | Performed by: INTERNAL MEDICINE

## 2022-04-15 PROCEDURE — 93010 ELECTROCARDIOGRAM REPORT: CPT | Performed by: INTERNAL MEDICINE

## 2022-04-15 PROCEDURE — 25010000002 VANCOMYCIN PER 500 MG: Performed by: NURSE PRACTITIONER

## 2022-04-15 PROCEDURE — C1898 LEAD, PMKR, OTHER THAN TRANS: HCPCS | Performed by: INTERNAL MEDICINE

## 2022-04-15 PROCEDURE — C1785 PMKR, DUAL, RATE-RESP: HCPCS | Performed by: INTERNAL MEDICINE

## 2022-04-15 PROCEDURE — 85027 COMPLETE CBC AUTOMATED: CPT | Performed by: INTERNAL MEDICINE

## 2022-04-15 PROCEDURE — 99153 MOD SED SAME PHYS/QHP EA: CPT | Performed by: INTERNAL MEDICINE

## 2022-04-15 PROCEDURE — 80048 BASIC METABOLIC PNL TOTAL CA: CPT | Performed by: INTERNAL MEDICINE

## 2022-04-15 PROCEDURE — 02HK3JZ INSERTION OF PACEMAKER LEAD INTO RIGHT VENTRICLE, PERCUTANEOUS APPROACH: ICD-10-PCS | Performed by: INTERNAL MEDICINE

## 2022-04-15 PROCEDURE — 25010000002 FENTANYL CITRATE (PF) 50 MCG/ML SOLUTION: Performed by: INTERNAL MEDICINE

## 2022-04-15 PROCEDURE — 33208 INSRT HEART PM ATRIAL & VENT: CPT | Performed by: INTERNAL MEDICINE

## 2022-04-15 PROCEDURE — 99152 MOD SED SAME PHYS/QHP 5/>YRS: CPT | Performed by: INTERNAL MEDICINE

## 2022-04-15 PROCEDURE — 0 IOPAMIDOL PER 1 ML: Performed by: INTERNAL MEDICINE

## 2022-04-15 PROCEDURE — 25010000002 VANCOMYCIN PER 500 MG: Performed by: INTERNAL MEDICINE

## 2022-04-15 PROCEDURE — 0JH606Z INSERTION OF PACEMAKER, DUAL CHAMBER INTO CHEST SUBCUTANEOUS TISSUE AND FASCIA, OPEN APPROACH: ICD-10-PCS | Performed by: INTERNAL MEDICINE

## 2022-04-15 PROCEDURE — 25010000002 MIDAZOLAM PER 1 MG: Performed by: INTERNAL MEDICINE

## 2022-04-15 PROCEDURE — 82962 GLUCOSE BLOOD TEST: CPT

## 2022-04-15 PROCEDURE — C1887 CATHETER, GUIDING: HCPCS | Performed by: INTERNAL MEDICINE

## 2022-04-15 PROCEDURE — 99222 1ST HOSP IP/OBS MODERATE 55: CPT

## 2022-04-15 DEVICE — IMPLANTABLE DEVICE: Type: IMPLANTABLE DEVICE | Status: FUNCTIONAL

## 2022-04-15 DEVICE — LD PM CAPSUREFIX NOVUS5076 52CM: Type: IMPLANTABLE DEVICE | Status: FUNCTIONAL

## 2022-04-15 DEVICE — GEN PM AZURE XT SURESCAN DR MRI: Type: IMPLANTABLE DEVICE | Status: FUNCTIONAL

## 2022-04-15 RX ORDER — CARVEDILOL 12.5 MG/1
12.5 TABLET ORAL EVERY 12 HOURS SCHEDULED
Status: DISCONTINUED | OUTPATIENT
Start: 2022-04-15 | End: 2022-04-16 | Stop reason: HOSPADM

## 2022-04-15 RX ORDER — HYDROCODONE BITARTRATE AND ACETAMINOPHEN 5; 325 MG/1; MG/1
1 TABLET ORAL EVERY 4 HOURS PRN
Status: DISCONTINUED | OUTPATIENT
Start: 2022-04-15 | End: 2022-04-16 | Stop reason: HOSPADM

## 2022-04-15 RX ORDER — MIDAZOLAM HYDROCHLORIDE 1 MG/ML
INJECTION INTRAMUSCULAR; INTRAVENOUS AS NEEDED
Status: DISCONTINUED | OUTPATIENT
Start: 2022-04-15 | End: 2022-04-15 | Stop reason: HOSPADM

## 2022-04-15 RX ORDER — VANCOMYCIN HYDROCHLORIDE 1 G/200ML
INJECTION, SOLUTION INTRAVENOUS CONTINUOUS PRN
Status: COMPLETED | OUTPATIENT
Start: 2022-04-15 | End: 2022-04-15

## 2022-04-15 RX ORDER — SODIUM CHLORIDE 0.9 % (FLUSH) 0.9 %
10 SYRINGE (ML) INJECTION AS NEEDED
Status: DISCONTINUED | OUTPATIENT
Start: 2022-04-15 | End: 2022-04-16 | Stop reason: HOSPADM

## 2022-04-15 RX ORDER — ACETAMINOPHEN 650 MG/1
650 SUPPOSITORY RECTAL EVERY 4 HOURS PRN
Status: DISCONTINUED | OUTPATIENT
Start: 2022-04-15 | End: 2022-04-16 | Stop reason: HOSPADM

## 2022-04-15 RX ORDER — ACETAMINOPHEN 325 MG/1
650 TABLET ORAL EVERY 4 HOURS PRN
Status: DISCONTINUED | OUTPATIENT
Start: 2022-04-15 | End: 2022-04-16 | Stop reason: HOSPADM

## 2022-04-15 RX ORDER — LIDOCAINE HYDROCHLORIDE AND EPINEPHRINE 10; 10 MG/ML; UG/ML
INJECTION, SOLUTION INFILTRATION; PERINEURAL AS NEEDED
Status: DISCONTINUED | OUTPATIENT
Start: 2022-04-15 | End: 2022-04-15 | Stop reason: HOSPADM

## 2022-04-15 RX ORDER — FENTANYL CITRATE 50 UG/ML
INJECTION, SOLUTION INTRAMUSCULAR; INTRAVENOUS AS NEEDED
Status: DISCONTINUED | OUTPATIENT
Start: 2022-04-15 | End: 2022-04-15 | Stop reason: HOSPADM

## 2022-04-15 RX ORDER — SODIUM CHLORIDE 0.9 % (FLUSH) 0.9 %
10 SYRINGE (ML) INJECTION EVERY 12 HOURS SCHEDULED
Status: DISCONTINUED | OUTPATIENT
Start: 2022-04-15 | End: 2022-04-16 | Stop reason: HOSPADM

## 2022-04-15 RX ORDER — VANCOMYCIN HYDROCHLORIDE 1 G/200ML
1000 INJECTION, SOLUTION INTRAVENOUS
Status: COMPLETED | OUTPATIENT
Start: 2022-04-15 | End: 2022-04-15

## 2022-04-15 RX ADMIN — HYDROCODONE BITARTRATE AND ACETAMINOPHEN 1 TABLET: 5; 325 TABLET ORAL at 20:08

## 2022-04-15 RX ADMIN — Medication 10 ML: at 20:08

## 2022-04-15 RX ADMIN — VANCOMYCIN HYDROCHLORIDE 1000 MG: 1 INJECTION, SOLUTION INTRAVENOUS at 15:31

## 2022-04-15 RX ADMIN — Medication 10 ML: at 21:10

## 2022-04-15 RX ADMIN — CARVEDILOL 12.5 MG: 12.5 TABLET, FILM COATED ORAL at 21:07

## 2022-04-15 RX ADMIN — AMLODIPINE BESYLATE 5 MG: 5 TABLET ORAL at 08:21

## 2022-04-15 RX ADMIN — Medication 10 ML: at 09:00

## 2022-04-15 RX ADMIN — VALSARTAN 160 MG: 160 TABLET, FILM COATED ORAL at 08:20

## 2022-04-15 NOTE — PLAN OF CARE
Problem: Adult Inpatient Plan of Care  Goal: Plan of Care Review  Outcome: Ongoing, Progressing  Goal: Patient-Specific Goal (Individualized)  Outcome: Ongoing, Progressing  Goal: Absence of Hospital-Acquired Illness or Injury  Outcome: Ongoing, Progressing  Intervention: Identify and Manage Fall Risk  Recent Flowsheet Documentation  Taken 4/15/2022 0000 by Felecia Billingsley RN  Safety Promotion/Fall Prevention: safety round/check completed  Taken 4/14/2022 2339 by Felecia Billingsley RN  Safety Promotion/Fall Prevention: safety round/check completed  Taken 4/14/2022 2213 by Felecia Billingsley RN  Safety Promotion/Fall Prevention: safety round/check completed  Taken 4/14/2022 2100 by Felecia Billingsley RN  Safety Promotion/Fall Prevention: safety round/check completed  Taken 4/14/2022 2045 by Felecia Billingsley RN  Safety Promotion/Fall Prevention: safety round/check completed  Intervention: Prevent Skin Injury  Recent Flowsheet Documentation  Taken 4/15/2022 0000 by Felecia Billingsley RN  Body Position:   position changed independently   right  Taken 4/14/2022 2213 by Felecia Billingsley RN  Body Position:   position changed independently   left  Taken 4/14/2022 2045 by Felecia Billingsley RN  Body Position:   position changed independently   supine  Goal: Optimal Comfort and Wellbeing  Outcome: Ongoing, Progressing  Goal: Readiness for Transition of Care  Outcome: Ongoing, Progressing  Intervention: Mutually Develop Transition Plan  Recent Flowsheet Documentation  Taken 4/15/2022 0403 by Felecia Billingsley RN  Equipment Currently Used at Home:   cpap   bp cuff   cane, straight  Taken 4/14/2022 2342 by Felecia Billingsley RN  Transportation Anticipated: family or friend will provide  Patient/Family Anticipated Services at Transition: none  Patient/Family Anticipates Transition to: home with family   Goal Outcome Evaluation:

## 2022-04-15 NOTE — PROGRESS NOTES
"      [facility]         Dr Carreon Sayied   LOS: 1 day   Patient Care Team:  Felecia Card PA as PCP - General (Internal Medicine)    Chief Complaint: Acute pulmonary edema with acute hypoxemic respiratory failure SHAMIKA complete heart block    Interval History: Events noted chart reviewed.  Currently on nasal cannula oxygen used is CPAP last night.    REVIEW OF SYSTEMS:   CARDIOVASCULAR: No chest pain, chest pressure or chest discomfort. No palpitations or edema.   RESPIRATORY: Short of breath with activity  GASTROINTESTINAL: No anorexia, nausea, vomiting or diarrhea. No abdominal pain or blood.   HEMATOLOGIC: No bleeding or bruising.     Ventilator/Non-Invasive Ventilation Settings (From admission, onward)            None            Vital Signs  Temp:  [98.2 °F (36.8 °C)-98.6 °F (37 °C)] 98.6 °F (37 °C)  Heart Rate:  [] 69  Resp:  [11-22] 12  BP: (138-193)/(59-81) 138/64    Intake/Output Summary (Last 24 hours) at 4/15/2022 0922  Last data filed at 4/15/2022 0600  Gross per 24 hour   Intake 243.5 ml   Output 4000 ml   Net -3756.5 ml     Flowsheet Rows    Flowsheet Row First Filed Value   Admission Height 172.7 cm (68\") Documented at 04/14/2022 1702   Admission Weight 106 kg (234 lb) Documented at 04/14/2022 1702          Physical Exam:  Patient is examined using the personal protective equipment as per guidelines from infection control for this particular patient as enacted.  Hand hygiene was performed before and after patient interaction.   General Appearance:    Alert, cooperative, in no acute distress.  Following simple commands  ENT Mallampati between 3 and 4 no nasal congestion  Neck midline trachea, no thyromegaly   Lungs:    Diminished breath sounds with occasional crackles bilaterally on the basis    Heart:    Regular rhythm and normal rate, normal S1 and S2, no            murmur, no gallop, no rub, no click   Chest Wall:    No abnormalities observed   Abdomen:     Normal bowel sounds, " no masses, no organomegaly, soft        nontender, nondistended, no guarding, no rebound                tenderness   Extremities:   Moves all extremities well, 2+ edema, no cyanosis, no             redness  CNS no focal neurological deficits normal sensory exam  Skin no rashes no nodules  Musculoskeletal no cyanosis no clubbing normal range of motion     Results Review:        Results from last 7 days   Lab Units 04/14/22  1705   SODIUM mmol/L 135*   POTASSIUM mmol/L 4.2   CHLORIDE mmol/L 102   CO2 mmol/L 21.4*   BUN mg/dL 25*   CREATININE mg/dL 1.21   GLUCOSE mg/dL 112*   CALCIUM mg/dL 9.6     Results from last 7 days   Lab Units 04/14/22  1705   TROPONIN T ng/mL <0.010     Results from last 7 days   Lab Units 04/15/22  0857 04/14/22  1705   WBC 10*3/mm3 9.29 9.67   HEMOGLOBIN g/dL 12.7* 12.7*   HEMATOCRIT % 37.2* 36.0*   PLATELETS 10*3/mm3 250 234     Results from last 7 days   Lab Units 04/14/22  1705   INR  1.21*         Results from last 7 days   Lab Units 04/14/22  1705   MAGNESIUM mg/dL 1.8               I reviewed the patient's new clinical results.  I personally viewed and interpreted the patient's chest x-ray.        Medication Review:   amLODIPine, 5 mg, Oral, Q24H  insulin regular, 0-9 Units, Subcutaneous, Q6H  sodium chloride, 10 mL, Intravenous, Q12H  valsartan, 160 mg, Oral, Q24H             ASSESSMENT:   Acute pulmonary edema  Acute hypoxemic respiratory failure  SHAMIKA  Complete heart block  Diabetes mellitus  Hypertension  Obesity  Hyperlipidemia      PLAN:  Clinically much improved with diuresis.  Further diuresis per cardiology  Wean down oxygen to keep sats over 90%  Home CPAP at bedside  Complete heart block per cardiology  Blood glucose monitoring  Mobilize ambulate  No objections to transfer out of the ICU      Lauri Mcmahon MD  04/15/22  09:22 EDT

## 2022-04-15 NOTE — CONSULTS
Electrophysiology Hospital Consult            Patient Name: Reginald Roth  Age/Sex: 72 y.o. male  : 1950  MRN: [id number]    Date of Admission: 2022  Date of Encounter Visit: 04/15/22  Encounter Provider: KAREN Carlin  Referring Provider: No ref. provider found  Place of Service: [facility] CARDIOLOGY  Patient Care Team:  Felecia Card PA as PCP - General (Internal Medicine)      Subjective:   EP Consultation for: Complete AV block     Chief Complaint: dyspnea, near-syncope    History of Present Illness:  Reginald Roth is a 72 y.o. male with a history of hypertension, type II DM, obesity, SHAMIKA (on CPAP).  No significant cardiac history, does not have a primary cardiologist.    He presented to the ED yesterday 2022 with complaints of shortness of breath, dizziness, and near-syncope. According to the patient and provider notes, he had similar symptoms in January and presented to [facility].  He had a cardiac workup which was unremarkable.  He was diagnosed with SHAMIKA and now uses a CPAP.     He saw his PCP earlier on  and was noted to have a heart rate in the 30s.  His heart rate along with continuing symptoms provoked his PCP to recommend that he go to the ED.  Upon arrival he was noted to be in complete heart block with a heart rate of 30.  Dr. Calles saw him and decided to take him to the cath lab for placement of temporary pacing wire.  He was continuously paced overnight.  Dr. Calles saw him this morning and turned down his pacer rate, he is still in 2:1 high degree AV block with a rate of 38bpm.    We have been asked to see him for evaluation of PPM.      Currently V-paced with temporary pacing wire, underlying 2:1 high degree AV block with rate in the 30s.  He feels much better today after temp pacer placement.  He says that he has been having intermittent episodes of shortness of breath, fatigue, and near syncope  since January. Currently on 3L NC with improvement in breathing.    He lives in [place] and currently retired.         Past Medical History:  Past Medical History:   Diagnosis Date   • Hyperlipidemia    • Hypertension    • Prostate disease    • Sleep apnea        Past Surgical History:   Procedure Laterality Date   • COLON SURGERY     • COLONOSCOPY     • JOINT REPLACEMENT         Home Medications:   No medications prior to admission.       Allergies:  Not on File    Past Social History:  Social History     Socioeconomic History   • Marital status:    Tobacco Use   • Smoking status: Never Smoker   • Smokeless tobacco: Never Used       Past Family History:  No family history on file.    Review of Systems: All systems reviewed. Pertinent positives identified in HPI. All other systems are negative.     14 point ROS was performed and is negative except as outlined in HPI.     Objective:     Objective:  Vital Signs (last 24 hours)       04/14 0700  04/15 0659 04/15 0700  04/15 1026   Most Recent      Temp (°F) 98.2 -  98.5      98.6     98.6 (37) 04/15 0700    Heart Rate 34 -  103      69     69 04/15 0900    Resp 11 - 22       12 04/14 2006    /72 -  193/68    138/64 -  170/75     138/64 04/15 0900    SpO2 (%) 86 -  99    93 -  95     93 04/15 0900        Temp:  [98.2 °F (36.8 °C)-98.6 °F (37 °C)] 98.6 °F (37 °C)  Heart Rate:  [] 69  Resp:  [11-22] 12  BP: (138-193)/(59-81) 138/64  Body mass index is 35.4 kg/m².        Physical Exam:     General Appearance: No acute distress, well developed and well nourished.   Eyes: Conjunctiva and lids: No erythema, swelling, or discharge. Sclera non-icteric.   HENT: Atraumatic, normocephalic. External eyes, ears, and nose normal.   Respiratory: No signs of respiratory distress. Respiration rhythm and depth normal.   Clear to auscultation. No rales, crackles, rhonchi, or wheezing auscultated.   Cardiovascular:  Heart Rate and Rhythm: V-paced, Heart Sounds:  Normal S1 and S2. No S3 or S4 noted.  Gastrointestinal:  Abdomen soft, non-distended, non-tender.  Musculoskeletal: Normal movement of extremities  Skin: Warm and dry.   Psychiatric: Patient alert and oriented to person, place, and time. Speech and behavior appropriate. Normal mood and affect.    Labs:   Lab Review:     Results from last 7 days   Lab Units 04/15/22  0857 04/14/22  1705   SODIUM mmol/L 139 135*   POTASSIUM mmol/L 3.8 4.2   CHLORIDE mmol/L 103 102   CO2 mmol/L 23.7 21.4*   BUN mg/dL 26* 25*   CREATININE mg/dL 1.33* 1.21   GLUCOSE mg/dL 135* 112*   CALCIUM mg/dL 9.5 9.6   AST (SGOT) U/L  --  18   ALT (SGPT) U/L  --  25     Results from last 7 days   Lab Units 04/14/22  1705   TROPONIN T ng/mL <0.010     Results from last 7 days   Lab Units 04/15/22  0857   WBC 10*3/mm3 9.29   HEMOGLOBIN g/dL 12.7*   HEMATOCRIT % 37.2*   PLATELETS 10*3/mm3 250     Results from last 7 days   Lab Units 04/14/22  1705   INR  1.21*         Results from last 7 days   Lab Units 04/14/22  1705   MAGNESIUM mg/dL 1.8         Results from last 7 days   Lab Units 04/14/22  1705   PROBNP pg/mL 2,432.0*         Results from last 7 days   Lab Units 04/14/22  1705   TSH uIU/mL 2.890           Imaging:   XR Chest 1 view  The lungs are somewhat poorly inflated. There is ill-defined increased  density in both lower lung zones that is primarily due to the low lung  volumes and overlying soft tissue. However, mild pulmonary vascular  congestion is also evident.. No focal areas of consolidation are  identified. No pleural effusions are evident. The heart is within normal  limits in size.     IMPRESSIONS: Poor lung expansion. Mild vascular congestion.      EKG:         I personally viewed and interpreted the patient's EKG/Telemetry tracings.    Assessment:       Complete heart block (HCC)    Third degree heart block (HCC)        Plan:   1. Complete heart block, high degree AV block- Dr. Calles inserted temporary pacer wire yesterday.  Patient  currently v-paced at 70, underlying rhythm is 2:1 high degree AV block with a rate of 30bpm.      2. Acute congestive heart failure- recent echo at [facility] showed normal LVEF. Patient has received IV diuretics, holding for now.     3. HTN- well controlled this afternoon, was hypertensive on admission.    Dr. Shetty discussed need for permanent pacemaker with the patient due to complete heart block.  Risks and benefits were discussed as well as expectations and follow up.  Patient is agreeable and would like to proceed.     -Orders have been placed, plan for pacemaker this afternoon.    Thank you for allowing me to participate in the care of Reginald Roth. Feel free to contact me directly with any further questions or concerns.    KAREN Carlin  [facility]  04/15/22  10:26 EDT

## 2022-04-15 NOTE — CONSULTS
[facility]    Reason for consult: AHRF    HPI:  Mr. Roth is a 71yo Male who has been having some shortness of breath on exertion and dizziness, weakness off and on for several months.  These episodes seemed to come and go on its own without any specific alleviating or exacerbating factors.  He had prior work up at [facility].  Over the last 10 days he has had increased shortness of breath on exertion and le edea.  He was found to hve hr in the 30's and was sent to ER via EMS>  He was in complete heart block on arrival.  He has been needing as much as 15lpm but has been weaned down now to 1bout 12 and sat are in the high 90's.  He has been given a temporary pacer and diuretic and is feeling improved.  He does have a cpap he uses at home for vanessa.      PMH:  VANESSA  DMII  HTN  HLD  Obesity    MEDS: reviewed as per chart  ALL: NKDA  ROS: 12 point negative except as in HPI    Social History     Socioeconomic History   • Marital status:      No tobacco     FAmily history: not relevant    Vital Sign Min/Max for last 24 hours  Temp  Min: 98.2 °F (36.8 °C)  Max: 98.4 °F (36.9 °C)   BP  Min: 163/63  Max: 193/68   Pulse  Min: 34  Max: 103   Resp  Min: 11  Max: 22   SpO2  Min: 86 %  Max: 96 %   Flow (L/min)  Min: 6  Max: 15   Weight  Min: 106 kg (234 lb)  Max: 106 kg (234 lb)     GEN:   appears ill, obese, AxOx3  HEENT: PERRL, EOMI, normal sclera, mmm, + jvd, trachea midline, neck supple  CHEST: decreased ae bilat, no wheezes, + crackles, no use of accessory muscles  CV: RRR, no m/g/r  ABD: soft, nt, nd +bs, no hepatosplenomegaly  EXT: no c/c/e  SKIN: no rashes, no xanthomas, nl turgor, warm, dry  LYMPH: no palpable cervical or supraclavicular lymphadenopathy  NEURO: CN 2-12 intact and symmetric bilaterally  PSYCH: nl affect, nl orientation, nl judgement, nl mood  MSK: No kyphoscoliosis, 5/5 strength ue nd le bilterally    Labs: 4/14: reviewed:  Glucose 112  Bun 25  Cr 1.2  Na 135  Bicarb 21  probnp  2432  Wbc 9.6  hgb 12.7  plts 234  Trop 0.01  CXR: 3/14: reviewed bilteral infiltates consitsetn with pulm edema    A/P:  1. Acute pulmonary edema --agree with diuresis  2. AHRF --wean oxygen as able,   3. SHAMIKA --home pap  4. Complete heart block --as per cards  5. DMII --ssi  6. HTN   7. HLD  8. Obesity

## 2022-04-15 NOTE — PROGRESS NOTES
[facility] Follow Up    Chief Complaint: Follow up complete heart block    Interval History: Breathing better.  No chest pain.  Down to 3 L nasal cannula.  Remains paced at 70 bpm.  I turned down his pacer rate and it appears that his underlying rhythm is 2:1 AV block with a ventricular rate of 38 bpm.    Objective:     Objective:  Temp:  [98.2 °F (36.8 °C)-98.6 °F (37 °C)] 98.6 °F (37 °C)  Heart Rate:  [] 69  Resp:  [11-22] 12  BP: (156-193)/(59-81) 170/75     Intake/Output Summary (Last 24 hours) at 4/15/2022 0757  Last data filed at 4/15/2022 0600  Gross per 24 hour   Intake 243.5 ml   Output 4000 ml   Net -3756.5 ml     Body mass index is 35.4 kg/m².      04/14/22  1702 04/14/22  2214 04/15/22  0425   Weight: 106 kg (234 lb) 106 kg (232 lb 12.9 oz) 106 kg (232 lb 12.9 oz)     Weight change:       Physical Exam:   General : Alert, cooperative, in no acute distress.  Neuro: Alert,cooperative and oriented.  Lungs: CTAB. Normal respiratory effort and rate.  CV: Regular rate and rhythm, normal S1 and S2, no murmurs, gallops or rubs.  ABD: Soft, nontender, nondistended. Positive bowel sounds.  Extr: No edema or cyanosis, moves all extremities.    Lab Review:   Results from last 7 days   Lab Units 04/14/22  1705   SODIUM mmol/L 135*   POTASSIUM mmol/L 4.2   CHLORIDE mmol/L 102   CO2 mmol/L 21.4*   BUN mg/dL 25*   CREATININE mg/dL 1.21   GLUCOSE mg/dL 112*   CALCIUM mg/dL 9.6   AST (SGOT) U/L 18   ALT (SGPT) U/L 25     Results from last 7 days   Lab Units 04/14/22  1705   TROPONIN T ng/mL <0.010     Results from last 7 days   Lab Units 04/14/22  1705   WBC 10*3/mm3 9.67   HEMOGLOBIN g/dL 12.7*   HEMATOCRIT % 36.0*   PLATELETS 10*3/mm3 234     Results from last 7 days   Lab Units 04/14/22  1705   INR  1.21*     Results from last 7 days   Lab Units 04/14/22  1705   MAGNESIUM mg/dL 1.8           Invalid input(s): LDLCALC  Results from last 7 days   Lab Units 04/14/22  1705   PROBNP pg/mL  2,432.0*     Results from last 7 days   Lab Units 04/14/22  1705   TSH uIU/mL 2.890     I reviewed the patient's new clinical results.  I personally viewed and interpreted the patient's EKG  Current Medications:   Scheduled Meds:amLODIPine, 5 mg, Oral, Q24H  insulin regular, 0-9 Units, Subcutaneous, Q6H  sodium chloride, 10 mL, Intravenous, Q12H  valsartan, 160 mg, Oral, Q24H      Continuous Infusions:     Allergies:  Not on File    Assessment/Plan:     1. Complete heart block.  Symptomatic.  Now status post temporary pacer wire placement.  Has underlying 2-1 AV block with a rate of 38 bpm this morning.   2. Acute congestive heart failure. Likely due to #1.  Recent echo at [facility] in 1/2022 showed normal left ventricular systolic function.    Diuresed well with an IV dose of furosemide following admission.  suspect this is related to his complete heart block.  3. Hypertension.  Elevated on admission likely compensatory due to heart block.  Proved this morning.  4. Hyperlipidemia  5. Diabetes mellitus type 2  6.  Acute hypoxic respiratory failure.  Due to volume overload.  Improved and down to 3 L nasal cannula today.  7.  Acute kidney injury.  Creatinine has trended up this morning.  May be due to a combination of heart block and diuresis.    -Hold on further diuresis today.  -Continue to wean oxygen as tolerates.  -Monitor renal function.  -Proceed with permanent pacemaker placement today.  I discussed this with the patient and he agrees to proceed.    Madelaine Calles MD  04/15/22  07:57 EDT

## 2022-04-15 NOTE — PROGRESS NOTES
Discharge Planning Assessment  [facility]     Patient Name: Reginald Roth  MRN: [id number]  Today's Date: 4/15/2022    Admit Date: 4/14/2022     Discharge Needs Assessment     Row Name 04/15/22 1507       Living Environment    People in Home spouse    Current Living Arrangements home    Potentially Unsafe Housing Conditions unable to assess    Primary Care Provided by self    Provides Primary Care For no one    Family Caregiver if Needed spouse;child(abisai), adult    Quality of Family Relationships supportive    Able to Return to Prior Arrangements yes       Resource/Environmental Concerns    Resource/Environmental Concerns none    Transportation Concerns none       Transition Planning    Patient/Family Anticipates Transition to home with family    Patient/Family Anticipated Services at Transition none    Transportation Anticipated family or friend will provide       Discharge Needs Assessment    Equipment Currently Used at Home cpap;bp cuff;cane, straight    Concerns to be Addressed discharge planning    Anticipated Changes Related to Illness none    Equipment Needed After Discharge none    Provided Post Acute Provider List? N/A    Provided Post Acute Provider Quality & Resource List? N/A               Discharge Plan     Row Name 04/15/22 1508       Plan    Plan Home with no needs    Plan Comments IMM noted.  CCP spoke to patient at bed side.   CCP role explained and discharge planning discussed.  Face sheet verified.    Pt wife Staci, [phone] is his emergency contact.  His PCP is Felecia SILVA. He lives in a single story house with his wife and adult daughter.   He is independent with ADL’s.  He uses no DME to ambulate.  He has no history of Home Health.  He has no rehab history.  [facility] or [place] mail order.  He would like meds to bed.  Discharge plan is home   Plan for home after pacemaker placement   CCP will follow              Continued Care  and Services - Admitted Since 4/14/2022    Coordination has not been started for this encounter.          Demographic Summary    No documentation.                Functional Status    No documentation.                Psychosocial    No documentation.                Abuse/Neglect    No documentation.                Legal    No documentation.                Substance Abuse    No documentation.                Patient Forms    No documentation.                   Carmen Marx RN

## 2022-04-16 VITALS
BODY MASS INDEX: 33.34 KG/M2 | DIASTOLIC BLOOD PRESSURE: 81 MMHG | RESPIRATION RATE: 18 BRPM | HEART RATE: 63 BPM | TEMPERATURE: 98 F | WEIGHT: 220 LBS | HEIGHT: 68 IN | SYSTOLIC BLOOD PRESSURE: 173 MMHG | OXYGEN SATURATION: 91 %

## 2022-04-16 LAB
ANION GAP SERPL CALCULATED.3IONS-SCNC: 13 MMOL/L (ref 5–15)
BUN SERPL-MCNC: 19 MG/DL (ref 8–23)
BUN/CREAT SERPL: 18.1 (ref 7–25)
CALCIUM SPEC-SCNC: 8.9 MG/DL (ref 8.6–10.5)
CHLORIDE SERPL-SCNC: 103 MMOL/L (ref 98–107)
CO2 SERPL-SCNC: 22 MMOL/L (ref 22–29)
CREAT SERPL-MCNC: 1.05 MG/DL (ref 0.76–1.27)
EGFRCR SERPLBLD CKD-EPI 2021: 75.4 ML/MIN/1.73
GLUCOSE BLDC GLUCOMTR-MCNC: 106 MG/DL (ref 70–130)
GLUCOSE BLDC GLUCOMTR-MCNC: 135 MG/DL (ref 70–130)
GLUCOSE BLDC GLUCOMTR-MCNC: 156 MG/DL (ref 70–130)
GLUCOSE SERPL-MCNC: 122 MG/DL (ref 65–99)
POTASSIUM SERPL-SCNC: 3.6 MMOL/L (ref 3.5–5.2)
QT INTERVAL: 445 MS
QT INTERVAL: 471 MS
SODIUM SERPL-SCNC: 138 MMOL/L (ref 136–145)

## 2022-04-16 PROCEDURE — 82962 GLUCOSE BLOOD TEST: CPT

## 2022-04-16 PROCEDURE — 93005 ELECTROCARDIOGRAM TRACING: CPT | Performed by: INTERNAL MEDICINE

## 2022-04-16 PROCEDURE — 80048 BASIC METABOLIC PNL TOTAL CA: CPT | Performed by: INTERNAL MEDICINE

## 2022-04-16 PROCEDURE — 99024 POSTOP FOLLOW-UP VISIT: CPT | Performed by: INTERNAL MEDICINE

## 2022-04-16 PROCEDURE — 93010 ELECTROCARDIOGRAM REPORT: CPT | Performed by: INTERNAL MEDICINE

## 2022-04-16 RX ORDER — FINASTERIDE 5 MG/1
5 TABLET, FILM COATED ORAL DAILY
COMMUNITY

## 2022-04-16 RX ORDER — HYDROCHLOROTHIAZIDE 25 MG/1
25 TABLET ORAL DAILY
COMMUNITY

## 2022-04-16 RX ORDER — CARVEDILOL 12.5 MG/1
12.5 TABLET ORAL EVERY 12 HOURS SCHEDULED
Qty: 90 TABLET | Refills: 3 | Status: SHIPPED | OUTPATIENT
Start: 2022-04-16

## 2022-04-16 RX ORDER — VALSARTAN 160 MG/1
160 TABLET ORAL DAILY
COMMUNITY

## 2022-04-16 RX ORDER — CARVEDILOL 12.5 MG/1
12.5 TABLET ORAL EVERY 12 HOURS SCHEDULED
Qty: 90 TABLET | Refills: 3 | Status: SHIPPED | OUTPATIENT
Start: 2022-04-16 | End: 2022-04-16 | Stop reason: SDUPTHER

## 2022-04-16 RX ORDER — ATORVASTATIN CALCIUM 20 MG/1
20 TABLET, FILM COATED ORAL 2 TIMES DAILY
COMMUNITY

## 2022-04-16 RX ORDER — AMLODIPINE BESYLATE 5 MG/1
5 TABLET ORAL DAILY
COMMUNITY

## 2022-04-16 RX ORDER — HYDROCODONE BITARTRATE AND ACETAMINOPHEN 5; 325 MG/1; MG/1
1 TABLET ORAL EVERY 8 HOURS PRN
Qty: 15 TABLET | Refills: 0 | Status: SHIPPED | OUTPATIENT
Start: 2022-04-16 | End: 2022-04-19 | Stop reason: SDUPTHER

## 2022-04-16 RX ADMIN — CARVEDILOL 12.5 MG: 12.5 TABLET, FILM COATED ORAL at 08:25

## 2022-04-16 RX ADMIN — POTASSIUM CHLORIDE 40 MEQ: 750 TABLET, EXTENDED RELEASE ORAL at 12:09

## 2022-04-16 RX ADMIN — POTASSIUM CHLORIDE 40 MEQ: 750 TABLET, EXTENDED RELEASE ORAL at 06:13

## 2022-04-16 RX ADMIN — AMLODIPINE BESYLATE 5 MG: 5 TABLET ORAL at 08:25

## 2022-04-16 RX ADMIN — CARVEDILOL 12.5 MG: 12.5 TABLET, FILM COATED ORAL at 19:29

## 2022-04-16 RX ADMIN — VALSARTAN 160 MG: 160 TABLET, FILM COATED ORAL at 08:25

## 2022-04-16 RX ADMIN — HYDROCODONE BITARTRATE AND ACETAMINOPHEN 1 TABLET: 5; 325 TABLET ORAL at 02:08

## 2022-04-16 RX ADMIN — HYDROCODONE BITARTRATE AND ACETAMINOPHEN 1 TABLET: 5; 325 TABLET ORAL at 06:13

## 2022-04-16 RX ADMIN — Medication 10 ML: at 08:26

## 2022-04-16 NOTE — PROGRESS NOTES
"      [facility]         Dr Carreon Sayied   LOS: 2 days   Patient Care Team:  Felecia Card PA as PCP - General (Internal Medicine)    Chief Complaint: Acute pulmonary edema with acute hypoxemic respiratory failure SHAMIKA complete heart block    Interval History: Currently on home CPAP.  He has been weaned down to 2 L oxygen nasal cannula.  Status post permanent pacemaker placement.  Tolerated procedure well.    REVIEW OF SYSTEMS:   CARDIOVASCULAR: No chest pain, chest pressure or chest discomfort. No palpitations or edema.   RESPIRATORY: Short of breath with activity  GASTROINTESTINAL: No anorexia, nausea, vomiting or diarrhea. No abdominal pain or blood.   HEMATOLOGIC: No bleeding or bruising.     Ventilator/Non-Invasive Ventilation Settings (From admission, onward)            None            Vital Signs  Temp:  [97.8 °F (36.6 °C)-99.2 °F (37.3 °C)] 97.8 °F (36.6 °C)  Heart Rate:  [61-73] 61  Resp:  [10-30] 18  BP: (145-171)/(68-80) 145/74    Intake/Output Summary (Last 24 hours) at 4/16/2022 1254  Last data filed at 4/16/2022 1228  Gross per 24 hour   Intake 1685 ml   Output 2300 ml   Net -615 ml     Flowsheet Rows    Flowsheet Row First Filed Value   Admission Height 172.7 cm (68\") Documented at 04/14/2022 1702   Admission Weight 106 kg (234 lb) Documented at 04/14/2022 1702          Physical Exam:  Patient is examined using the personal protective equipment as per guidelines from infection control for this particular patient as enacted.  Hand hygiene was performed before and after patient interaction.   General Appearance:    Alert, cooperative, in no acute distress.  Following simple commands  ENT Mallampati between 3 and 4 no nasal congestion  Neck midline trachea, no thyromegaly   Lungs:    Diminished breath sounds with occasional crackles bilaterally on the basis    Heart:    Regular rhythm and normal rate, normal S1 and S2, no            murmur, no gallop, no rub, no click   Chest Wall:    No " abnormalities observed   Abdomen:     Normal bowel sounds, no masses, no organomegaly, soft        nontender, nondistended, no guarding, no rebound                tenderness   Extremities:   Moves all extremities well, 2+ edema, no cyanosis, no             redness  CNS no focal neurological deficits normal sensory exam  Skin no rashes no nodules  Musculoskeletal no cyanosis no clubbing normal range of motion     Results Review:        Results from last 7 days   Lab Units 04/16/22  0303 04/15/22  0857 04/14/22  1705   SODIUM mmol/L 138 139 135*   POTASSIUM mmol/L 3.6 3.8 4.2   CHLORIDE mmol/L 103 103 102   CO2 mmol/L 22.0 23.7 21.4*   BUN mg/dL 19 26* 25*   CREATININE mg/dL 1.05 1.33* 1.21   GLUCOSE mg/dL 122* 135* 112*   CALCIUM mg/dL 8.9 9.5 9.6     Results from last 7 days   Lab Units 04/14/22  1705   TROPONIN T ng/mL <0.010     Results from last 7 days   Lab Units 04/15/22  0857 04/14/22  1705   WBC 10*3/mm3 9.29 9.67   HEMOGLOBIN g/dL 12.7* 12.7*   HEMATOCRIT % 37.2* 36.0*   PLATELETS 10*3/mm3 250 234     Results from last 7 days   Lab Units 04/14/22  1705   INR  1.21*         Results from last 7 days   Lab Units 04/14/22  1705   MAGNESIUM mg/dL 1.8               I reviewed the patient's new clinical results.  I personally viewed and interpreted the patient's chest x-ray.        Medication Review:   amLODIPine, 5 mg, Oral, Q24H  carvedilol, 12.5 mg, Oral, Q12H  insulin regular, 0-9 Units, Subcutaneous, 4x Daily AC & at Bedtime  sodium chloride, 10 mL, Intravenous, Q12H  sodium chloride, 10 mL, Intravenous, Q12H  valsartan, 160 mg, Oral, Q24H             ASSESSMENT:   Acute pulmonary edema  Acute hypoxemic respiratory failure  SHAMIKA  Complete heart block  Diabetes mellitus  Hypertension  Obesity  Hyperlipidemia      PLAN:  Clinically much improved with diuresis.  Further diuresis per cardiology  Wean down oxygen to keep sats over 90%.  At baseline he does not use any oxygen  Home CPAP at bedside  Complete heart  block per cardiology  Blood glucose monitoring  Mobilize ambulate  No objections to discharge      Lauri Mcmahon MD  04/16/22  12:54 EDT

## 2022-04-17 ENCOUNTER — NURSE TRIAGE (OUTPATIENT)
Dept: CALL CENTER | Facility: HOSPITAL | Age: 72
End: 2022-04-17

## 2022-04-17 NOTE — TELEPHONE ENCOUNTER
He was d/c fr hospital yesterday, scripts to be sent to [facility], and  they do have Coreg but not Norco,what to do? Told will send msg. to . Have CM call Grand daughterMariana at [phone]  Reason for Disposition  • [1] Prescription not at pharmacy AND [2] was prescribed by PCP recently (Exception: triager has access to EMR and prescription is recorded there. Go to Home Care and confirm for pharmacy.)    Additional Information  • Negative: [1] Intentional drug overdose AND [2] suicidal thoughts or ideas  • Negative: Drug overdose and triager unable to answer question  • Negative: Caller requesting information unrelated to medicine  • Negative: Caller requesting information about COVID-19 Vaccine  • Negative: Caller requesting information about Emergency Contraception  • Negative: Caller requesting information about Combined Birth Control Pills  • Negative: Caller requesting information about Progestin Birth Control Pills  • Negative: Caller requesting information about Post-Op pain or medicines  • Negative: Caller requesting a prescription antibiotic (such as Penicillin) for Strep throat and has a positive culture result  • Negative: Caller requesting a prescription anti-viral med (such as Tamiflu) and has influenza (flu)  symptoms  • Negative: Immunization reaction suspected  • Negative: Rash while taking a medicine or within 3 days of stopping it  • Negative: [1] Asthma and [2] having symptoms of asthma (cough, wheezing, etc.)  • Negative: [1] Symptom of illness (e.g., headache, abdominal pain, earache, vomiting) AND [2] more than mild  • Negative: Breastfeeding questions about mother's medicines and diet  • Negative: MORE THAN A DOUBLE DOSE of a prescription or over-the-counter (OTC) drug  • Negative: [1] DOUBLE DOSE (an extra dose or lesser amount) of prescription drug AND [2] any symptoms (e.g., dizziness, nausea, pain, sleepiness)  • Negative: [1] DOUBLE DOSE (an extra dose  "or lesser amount) of over-the-counter (OTC) drug AND [2] any symptoms (e.g., dizziness, nausea, pain, sleepiness)  • Negative: Took another person's prescription drug  • Negative: [1] DOUBLE DOSE (an extra dose or lesser amount) of prescription drug AND [2] NO symptoms (Exception: a double dose of antibiotics)  • Negative: Diabetes drug error or overdose (e.g., took wrong type of insulin or took extra dose)  • Negative: [1] Prescription refill request for ESSENTIAL medicine (i.e., likelihood of harm to patient if not taken) AND [2] triager unable to refill per department policy    Answer Assessment - Initial Assessment Questions  1. NAME of MEDICATION: \"What medicine are you calling about?\"      Norco  2. QUESTION: \"What is your question?\" (e.g., medication refill, side effect)       3. PRESCRIBING HCP: \"Who prescribed it?\" Reason: if prescribed by specialist, call should be referred to that group.      Not at pharmacy  4. SYMPTOMS: \"Do you have any symptoms?\"      Post op pain  5. SEVERITY: If symptoms are present, ask \"Are they mild, moderate or severe?\"      Mild mod  6. PREGNANCY:  \"Is there any chance that you are pregnant?\" \"When was your last menstrual period?\"      no    Protocols used: MEDICATION QUESTION CALL-ADULT-AH      "

## 2022-04-19 DIAGNOSIS — I44.2 COMPLETE HEART BLOCK: ICD-10-CM

## 2022-04-19 RX ORDER — HYDROCODONE BITARTRATE AND ACETAMINOPHEN 5; 325 MG/1; MG/1
1 TABLET ORAL EVERY 8 HOURS PRN
Qty: 15 TABLET | Refills: 0 | Status: SHIPPED | OUTPATIENT
Start: 2022-04-19 | End: 2022-04-25

## 2022-04-19 NOTE — DISCHARGE SUMMARY
Hospital Discharge    Patient Name: Reginald Roth  Age/Sex: 72 y.o. male  : 1950  MRN: [id number]    Encounter Provider: Liliya Serrano MD  Referring Provider: No ref. provider found  Place of Service: [facility] CARDIOLOGY  Patient Care Team:  Felecia Card PA as PCP - General (Internal Medicine)         Date of Discharge:  2022   Date of Admit: 2022    Discharge Condition: Good  Discharge Diagnosis:    Complete heart block (HCC)    Third degree heart block (HCC)      Hospital Course:   Reginald Roth is a 72 y.o. male who presented with dizziness. He was found to have complete heart block. On the day of admission a temporary pacermaker was placed by Dr. Calles and then the following day he received a dual chamber pacemaker with Dr. Shetty. He did well after his procedure and was discharged home.     Objective:     No intake or output data in the 24 hours ending 22 1041  Body mass index is 33.45 kg/m².      22  2214 04/15/22  0425 22  0650   Weight: 106 kg (232 lb 12.9 oz) 106 kg (232 lb 12.9 oz) 99.8 kg (220 lb)     Weight change:     Physical Exam:  GEN: no distress, alert and oriented  Eyes: normal sclerae, normal lids and lashes  HENT: moist mucous membranes,   Lungs: CTAB, no rales or wheezes  Chest: no abnormalities  Neck: no JVD or carotid bruits  CV: RRR, no murmurs, +2 DP and 2+ carotid pulses b/l  Abdomen: soft, nontender, nondistended  Extremities: no edema  Skin: no rash, warm, dry  Heme/Lymph: no bruising  Psych: organized thought, normal behavior and affect  No drainage or redness at pocket    Procedures Performed  Procedure(s):  Pacemaker DC new       Consults:  Consults     Date and Time Order Name Status Description    2022  8:09 PM Inpatient Pulmonology Consult Completed           Pertinent Test Results:  Results from last 7 days   Lab Units 22  0303 04/15/22  0857 22  1705   SODIUM mmol/L 138 139 135*    POTASSIUM mmol/L 3.6 3.8 4.2   CHLORIDE mmol/L 103 103 102   CO2 mmol/L 22.0 23.7 21.4*   BUN mg/dL 19 26* 25*   CREATININE mg/dL 1.05 1.33* 1.21   GLUCOSE mg/dL 122* 135* 112*   CALCIUM mg/dL 8.9 9.5 9.6   AST (SGOT) U/L  --   --  18   ALT (SGPT) U/L  --   --  25     Results from last 7 days   Lab Units 04/14/22  1705   TROPONIN T ng/mL <0.010     Results from last 7 days   Lab Units 04/15/22  0857 04/14/22  1705   WBC 10*3/mm3 9.29 9.67   HEMOGLOBIN g/dL 12.7* 12.7*   HEMATOCRIT % 37.2* 36.0*   PLATELETS 10*3/mm3 250 234     Results from last 7 days   Lab Units 04/14/22  1705   INR  1.21*     Results from last 7 days   Lab Units 04/14/22  1705   MAGNESIUM mg/dL 1.8           Invalid input(s): LDLCALC  Results from last 7 days   Lab Units 04/14/22  1705   PROBNP pg/mL 2,432.0*     Results from last 7 days   Lab Units 04/14/22  1705   TSH uIU/mL 2.890       Discharge Medications     Discharge Medications      New Medications      Instructions Start Date   HYDROcodone-acetaminophen 5-325 MG per tablet  Commonly known as: NORCO   Take 1 tablet by mouth Every 8 (Eight) Hours As Needed for Severe Pain.         Continue These Medications      Instructions Start Date   amLODIPine 5 MG tablet  Commonly known as: NORVASC   5 mg, Oral, Daily      atorvastatin 20 MG tablet  Commonly known as: LIPITOR   20 mg, Oral, Nightly      Diclofenac Sodium 1 % gel gel  Commonly known as: VOLTAREN   Topical, 4 Times Daily PRN      finasteride 5 MG tablet  Commonly known as: PROSCAR   5 mg, Oral, Daily      hydroCHLOROthiazide 25 MG tablet  Commonly known as: HYDRODIURIL   25 mg, Oral, Daily      valsartan 160 MG tablet  Commonly known as: DIOVAN   160 mg, Oral, Daily             Discharge Diet:      Activity at Discharge:       Discharge disposition: home     Discharge Instructions and Follow ups:  Future Appointments   Date Time Provider Department Center   4/22/2022  8:30 AM PACEART IN OFFICE, [facility]      Follow-up  Information     Felecia Card PA. Schedule an appointment as soon as possible for a visit in 1 week(s).    Specialty: Internal Medicine  Contact information:  [address] [facility] [id number] [phone]             Madelaine Calles MD Follow up.    Specialty: Cardiology  Contact information:  [address] [facility]  [facility] [id number] [phone]             Bola Shetty MD Follow up.    Specialties: Cardiology, Cardiac Electrophysiology  Contact information:  [address] [id number] [phone]                         Test Results Pending at Discharge:      Liliya Serrano MD  04/19/22  10:41 EDT    Time: Discharge 30 min    EMR Dragon/Transcription disclaimer:   Much of this encounter note is an electronic transcription/translation of spoken language to printed text. The electronic translation of spoken language may permit erroneous, or at times, nonsensical words or phrases to be inadvertently transcribed; Although I have reviewed the note for such errors, some may still exist.

## 2022-04-19 NOTE — CASE MANAGEMENT/SOCIAL WORK
Case Management Discharge Note      Final Note: home    Provided Post Acute Provider List?: N/A  Provided Post Acute Provider Quality & Resource List?: N/A    Selected Continued Care - Discharged on 4/16/2022 Admission date: 4/14/2022 - Discharge disposition: Home or Self Care    Destination    No services have been selected for the patient.              Durable Medical Equipment    No services have been selected for the patient.              Dialysis/Infusion    No services have been selected for the patient.              Home Medical Care    No services have been selected for the patient.              Therapy    No services have been selected for the patient.              Community Resources    No services have been selected for the patient.              Community & DME    No services have been selected for the patient.                       Final Discharge Disposition Code: 01 - home or self-care

## 2022-04-22 ENCOUNTER — CLINICAL SUPPORT NO REQUIREMENTS (OUTPATIENT)
Dept: CARDIOLOGY | Facility: CLINIC | Age: 72
End: 2022-04-22

## 2022-04-22 DIAGNOSIS — I44.2 COMPLETE HEART BLOCK: Primary | ICD-10-CM

## 2022-04-22 PROCEDURE — 93280 PM DEVICE PROGR EVAL DUAL: CPT | Performed by: INTERNAL MEDICINE

## 2022-05-31 ENCOUNTER — CLINICAL SUPPORT NO REQUIREMENTS (OUTPATIENT)
Dept: CARDIOLOGY | Facility: CLINIC | Age: 72
End: 2022-05-31

## 2022-05-31 ENCOUNTER — OFFICE VISIT (OUTPATIENT)
Dept: CARDIOLOGY | Facility: CLINIC | Age: 72
End: 2022-05-31

## 2022-05-31 VITALS
WEIGHT: 215 LBS | DIASTOLIC BLOOD PRESSURE: 68 MMHG | HEART RATE: 63 BPM | HEIGHT: 68 IN | SYSTOLIC BLOOD PRESSURE: 124 MMHG | BODY MASS INDEX: 32.58 KG/M2

## 2022-05-31 DIAGNOSIS — I44.2 COMPLETE HEART BLOCK: Primary | ICD-10-CM

## 2022-05-31 DIAGNOSIS — Z95.0 PRESENCE OF CARDIAC PACEMAKER: ICD-10-CM

## 2022-05-31 DIAGNOSIS — I10 PRIMARY HYPERTENSION: ICD-10-CM

## 2022-05-31 PROCEDURE — 93280 PM DEVICE PROGR EVAL DUAL: CPT | Performed by: INTERNAL MEDICINE

## 2022-05-31 PROCEDURE — 99024 POSTOP FOLLOW-UP VISIT: CPT | Performed by: NURSE PRACTITIONER

## 2022-05-31 PROCEDURE — 93000 ELECTROCARDIOGRAM COMPLETE: CPT | Performed by: NURSE PRACTITIONER

## 2022-05-31 NOTE — PROGRESS NOTES
"Date of Office Visit: 2022  Encounter Provider: KAREN Cobb  Place of Service: [facility] CARDIOLOGY  Patient Name: Reginald Roth  :1950    Chief Complaint   Patient presents with   • Pacemaker Check     [facility] follow up   :     HPI: Reginald Roth is a 72 y.o. male who presented to the hospital in April w/dizziness and found to be in CHB.     PPM by Dr. Shetty on 4/15.     Presents for follow up and device check.     Doing well. \"I have more energy.\" No chest pain, dyspnea, PND, orthopnea, dizziness of edema.    Device check within normal limits. A paced 71%, V paced 99.7%, no arrhythmia episodes.      Past Medical History:   Diagnosis Date   • Acute congestive heart failure (HCC)    • Complete heart block (HCC)    • Diabetes mellitus (HCC)    • Hyperlipidemia    • Hypertension    • Prostate disease    • Sleep apnea    • Third degree heart block (HCC)        Past Surgical History:   Procedure Laterality Date   • CARDIAC ELECTROPHYSIOLOGY PROCEDURE N/A 2022    Procedure: Temporary Pacemaker;  Surgeon: Madelaine Calles MD;  Location: [facility] INVASIVE LOCATION;  Service: Cardiology;  Laterality: N/A;   • CARDIAC ELECTROPHYSIOLOGY PROCEDURE N/A 4/15/2022    Procedure: Pacemaker DC new;  Surgeon: Bola Shetty MD;  Location: [facility] INVASIVE LOCATION;  Service: Cardiology;  Laterality: N/A;   • COLON SURGERY     • COLONOSCOPY     • JOINT REPLACEMENT         Social History     Socioeconomic History   • Marital status:    Tobacco Use   • Smoking status: Never Smoker   • Smokeless tobacco: Never Used   Vaping Use   • Vaping Use: Never used   Substance and Sexual Activity   • Alcohol use: Yes     Alcohol/week: 4.0 standard drinks     Types: 4 Cans of beer per week   • Drug use: Defer   • Sexual activity: Defer       History reviewed. No pertinent family history.    Review of Systems   Constitutional: Negative for chills, fever and " "malaise/fatigue.   Cardiovascular: Negative for chest pain, dyspnea on exertion, leg swelling, near-syncope, orthopnea, palpitations, paroxysmal nocturnal dyspnea and syncope.   Respiratory: Negative for cough and shortness of breath.    Musculoskeletal: Negative for joint pain, joint swelling and myalgias.   Gastrointestinal: Negative for abdominal pain, diarrhea, melena, nausea and vomiting.   Genitourinary: Negative for frequency and hematuria.   Neurological: Negative for light-headedness, numbness, paresthesias and seizures.   Allergic/Immunologic: Negative.    All other systems reviewed and are negative.      No Known Allergies      Current Outpatient Medications:   •  amLODIPine (NORVASC) 5 MG tablet, Take 5 mg by mouth Daily., Disp: , Rfl:   •  atorvastatin (LIPITOR) 20 MG tablet, Take 20 mg by mouth Every Night., Disp: , Rfl:   •  carvedilol (COREG) 12.5 MG tablet, Take 1 tablet by mouth Every 12 (Twelve) Hours., Disp: 90 tablet, Rfl: 3  •  finasteride (PROSCAR) 5 MG tablet, Take 5 mg by mouth Daily., Disp: , Rfl:   •  hydroCHLOROthiazide (HYDRODIURIL) 25 MG tablet, Take 25 mg by mouth Daily., Disp: , Rfl:   •  valsartan (DIOVAN) 160 MG tablet, Take 160 mg by mouth Daily., Disp: , Rfl:       Objective:     Vitals:    05/31/22 0844   BP: 124/68   Pulse: 63   Weight: 97.5 kg (215 lb)   Height: 172.7 cm (68\")     Body mass index is 32.69 kg/m².    PHYSICAL EXAM:    Vitals Reviewed.   General Appearance: No acute distress, well developed and well nourished.   Eyes: Conjunctiva and lids: No erythema, swelling, or discharge. Sclera non-icteric.   HENT: Atraumatic, normocephalic. External eyes, ears, and nose normal.   Respiratory: No signs of respiratory distress. Respiration rhythm and depth normal.   Clear to auscultation. No rales, crackles, rhonchi, or wheezing auscultated.   Cardiovascular:  Heart Rate and Rhythm: Normal, Heart Sounds: Normal S1 and S2. No S3 or S4 noted.  Murmurs: No murmurs noted. No rubs, " thrills, or gallops.   Arterial Pulses:  Posterior tibialis and dorsalis pedis pulses normal.   Lower Extremities: No edema noted.  Gastrointestinal:  Abdomen soft, non-distended, non-tender.   Musculoskeletal: Normal movement of extremities  Skin: Warm and dry.   Psychiatric: Patient alert and oriented to person, place, and time. Speech and behavior appropriate. Normal mood and affect.       ECG 12 Lead    Date/Time: 5/31/2022 9:20 AM  Performed by: Karine Wakefield APRN  Authorized by: Karine Wakefield APRN   Comparison: compared with previous ECG   Similar to previous ECG  Rhythm: paced  BPM: 63  Pacing: atrial sensed rhythm, ventricular paced rhythm and 100% capture            Assessment:       Diagnosis Plan   1. Complete heart block (HCC)     2. Presence of cardiac pacemaker     3. Primary hypertension            Plan:       1.-2. CHB, s/p PPM--normal function.     3. HTN, controlled.     Follow up with Dr. Shetty in 3 months w/device check.    As always, it has been a pleasure to participate in your patient's care.      Sincerely,         KAREN Hand

## 2022-09-07 ENCOUNTER — OFFICE VISIT (OUTPATIENT)
Dept: CARDIOLOGY | Facility: CLINIC | Age: 72
End: 2022-09-07

## 2022-09-07 ENCOUNTER — CLINICAL SUPPORT NO REQUIREMENTS (OUTPATIENT)
Dept: CARDIOLOGY | Facility: CLINIC | Age: 72
End: 2022-09-07

## 2022-09-07 VITALS
HEART RATE: 86 BPM | BODY MASS INDEX: 33.8 KG/M2 | DIASTOLIC BLOOD PRESSURE: 70 MMHG | SYSTOLIC BLOOD PRESSURE: 132 MMHG | WEIGHT: 223 LBS | HEIGHT: 68 IN

## 2022-09-07 DIAGNOSIS — Z95.0 PRESENCE OF CARDIAC PACEMAKER: ICD-10-CM

## 2022-09-07 DIAGNOSIS — I44.2 COMPLETE HEART BLOCK: Primary | ICD-10-CM

## 2022-09-07 PROCEDURE — 93000 ELECTROCARDIOGRAM COMPLETE: CPT | Performed by: INTERNAL MEDICINE

## 2022-09-07 PROCEDURE — 93280 PM DEVICE PROGR EVAL DUAL: CPT | Performed by: INTERNAL MEDICINE

## 2022-09-07 PROCEDURE — 99214 OFFICE O/P EST MOD 30 MIN: CPT | Performed by: INTERNAL MEDICINE

## 2022-09-07 NOTE — PROGRESS NOTES
Date of Office Visit: 2022  Encounter Provider: Bola Shetty MD  Place of Service: [facility] CARDIOLOGY  Patient Name: Reginald Roth  : 1950    Subjective:     Encounter Date:2022      Patient ID: Reginald Roth is a 72 y.o. male who has a cc of  Complete heart block and I put in LBB area pacer in April.     He feels much better.     The patient had a good year.   No anginal chest pain,   No sig meier,   No soa,   No fainting,  No orthostasis.   No edema.   Exercise tolerance: good.     There have been no hospital admission since the last visit.     There have been no bleeding events.       Past Medical History:   Diagnosis Date   • Acute congestive heart failure (HCC)    • Complete heart block (HCC)    • Diabetes mellitus (HCC)    • Hyperlipidemia    • Hypertension    • Prostate disease    • Sleep apnea    • Third degree heart block (HCC)        Social History     Socioeconomic History   • Marital status:    Tobacco Use   • Smoking status: Never Smoker   • Smokeless tobacco: Never Used   Vaping Use   • Vaping Use: Never used   Substance and Sexual Activity   • Alcohol use: Yes     Alcohol/week: 4.0 standard drinks     Types: 4 Cans of beer per week   • Drug use: Defer   • Sexual activity: Defer       History reviewed. No pertinent family history.    Review of Systems   Constitutional: Negative for fever and night sweats.   HENT: Negative for ear pain and stridor.    Eyes: Negative for discharge and visual halos.   Cardiovascular: Negative for cyanosis.   Respiratory: Negative for hemoptysis and sputum production.    Hematologic/Lymphatic: Negative for adenopathy.   Skin: Negative for nail changes and unusual hair distribution.   Musculoskeletal: Negative for gout and joint swelling.   Gastrointestinal: Negative for bowel incontinence and flatus.   Genitourinary: Negative for dysuria and flank pain.   Neurological: Negative for seizures and tremors.  "  Psychiatric/Behavioral: Negative for altered mental status. The patient is not nervous/anxious.             Objective:     Vitals:    09/07/22 1301   BP: 132/70   Pulse: 86   Weight: 101 kg (223 lb)   Height: 172.7 cm (68\")         Eyes:      General:         Right eye: No discharge.         Left eye: No discharge.   HENT:      Head: Normocephalic and atraumatic.   Neck:      Thyroid: No thyromegaly.      Vascular: No JVD.   Pulmonary:      Effort: Pulmonary effort is normal.      Breath sounds: Normal breath sounds. No rales.   Cardiovascular:      Normal rate. Regular rhythm.      No gallop.   Edema:     Peripheral edema absent.   Abdominal:      General: Bowel sounds are normal.      Palpations: Abdomen is soft.      Tenderness: There is no abdominal tenderness.   Musculoskeletal: Normal range of motion.         General: No deformity. Skin:     General: Skin is warm and dry.      Findings: No erythema.   Neurological:      Mental Status: Alert and oriented to person, place, and time.      Motor: Normal muscle tone.   Psychiatric:         Behavior: Behavior normal.         Thought Content: Thought content normal.           ECG 12 Lead    Date/Time: 9/7/2022 2:16 PM  Performed by: Bola Shetty MD  Authorized by: Bola Shetty MD   Comparison: compared with previous ECG   Similar to previous ECG  Rhythm: sinus rhythm and paced            Lab Review:       Assessment:          Diagnosis Plan   1. Complete heart block (HCC)     2. Presence of cardiac pacemaker            Plan:     Complete AV block. --pacer looks great. I reviewed the pacemaker/ICD tracings and the pacing and sensing parameters are normal.  AF burden is 0     I am really happy with the results.     We disc BMI Body mass index is 33.91 kg/m².         "

## 2022-11-25 PROCEDURE — 93296 REM INTERROG EVL PM/IDS: CPT | Performed by: INTERNAL MEDICINE

## 2022-11-25 PROCEDURE — 93294 REM INTERROG EVL PM/LDLS PM: CPT | Performed by: INTERNAL MEDICINE

## 2023-02-24 PROCEDURE — 93296 REM INTERROG EVL PM/IDS: CPT | Performed by: INTERNAL MEDICINE

## 2023-02-24 PROCEDURE — 93294 REM INTERROG EVL PM/LDLS PM: CPT | Performed by: INTERNAL MEDICINE

## 2023-03-09 ENCOUNTER — PREP FOR SURGERY (OUTPATIENT)
Dept: OTHER | Facility: HOSPITAL | Age: 73
End: 2023-03-09
Payer: MEDICARE

## 2023-03-09 DIAGNOSIS — Z86.010 HISTORY OF ADENOMATOUS POLYP OF COLON: Primary | ICD-10-CM

## 2023-03-09 DIAGNOSIS — Z85.038 PERSONAL HISTORY OF COLON CANCER: ICD-10-CM

## 2023-03-23 PROBLEM — Z85.038 PERSONAL HISTORY OF COLON CANCER: Status: ACTIVE | Noted: 2023-03-23

## 2023-03-23 PROBLEM — Z86.0101 HISTORY OF ADENOMATOUS POLYP OF COLON: Status: ACTIVE | Noted: 2023-03-23

## 2023-03-23 PROBLEM — Z86.010 HISTORY OF ADENOMATOUS POLYP OF COLON: Status: ACTIVE | Noted: 2023-03-23

## 2023-04-05 ENCOUNTER — OFFICE VISIT (OUTPATIENT)
Dept: CARDIOLOGY | Facility: CLINIC | Age: 73
End: 2023-04-05
Payer: MEDICARE

## 2023-04-05 ENCOUNTER — CLINICAL SUPPORT NO REQUIREMENTS (OUTPATIENT)
Dept: CARDIOLOGY | Facility: CLINIC | Age: 73
End: 2023-04-05
Payer: MEDICARE

## 2023-04-05 VITALS
DIASTOLIC BLOOD PRESSURE: 70 MMHG | SYSTOLIC BLOOD PRESSURE: 128 MMHG | BODY MASS INDEX: 33.65 KG/M2 | HEIGHT: 68 IN | WEIGHT: 222 LBS | HEART RATE: 63 BPM

## 2023-04-05 DIAGNOSIS — I44.2 COMPLETE HEART BLOCK: Primary | ICD-10-CM

## 2023-04-05 DIAGNOSIS — I10 PRIMARY HYPERTENSION: ICD-10-CM

## 2023-04-05 DIAGNOSIS — Z95.0 PRESENCE OF CARDIAC PACEMAKER: ICD-10-CM

## 2023-04-05 PROCEDURE — 93280 PM DEVICE PROGR EVAL DUAL: CPT | Performed by: INTERNAL MEDICINE

## 2023-04-05 PROCEDURE — 1159F MED LIST DOCD IN RCRD: CPT | Performed by: NURSE PRACTITIONER

## 2023-04-05 PROCEDURE — 3078F DIAST BP <80 MM HG: CPT | Performed by: NURSE PRACTITIONER

## 2023-04-05 PROCEDURE — 1160F RVW MEDS BY RX/DR IN RCRD: CPT | Performed by: NURSE PRACTITIONER

## 2023-04-05 PROCEDURE — 93000 ELECTROCARDIOGRAM COMPLETE: CPT | Performed by: NURSE PRACTITIONER

## 2023-04-05 PROCEDURE — 99213 OFFICE O/P EST LOW 20 MIN: CPT | Performed by: NURSE PRACTITIONER

## 2023-04-05 PROCEDURE — 3074F SYST BP LT 130 MM HG: CPT | Performed by: NURSE PRACTITIONER

## 2023-04-05 RX ORDER — TRIAMCINOLONE ACETONIDE 1 MG/G
CREAM TOPICAL AS NEEDED
COMMUNITY
Start: 2023-03-29

## 2023-04-05 NOTE — PROGRESS NOTES
Date of Office Visit: 2023  Encounter Provider: KAREN Cobb  Place of Service: [facility] CARDIOLOGY  Patient Name: Reginald Roth  :1950    Chief Complaint   Patient presents with   • complete heart block   • Pacemaker Check   :     HPI: Reginald Roth is a 73 y.o. male who is followed by Dr. Shetty--complete heart block, status post permanent pacemaker in 2022.    Presents for routine follow-up and device check.    He is doing well.  No chest pain, dyspnea, PND, orthopnea, dizziness or palpitations.    Device interrogation shows normal testing and function.  He is atrially paced 77% and ventricular paced 99.6%.  AT/AF burden less than 0.1% but there were no recorded electrograms to review.     Past Medical History:   Diagnosis Date   • Acute congestive heart failure    • Complete heart block    • Diabetes mellitus    • Hyperlipidemia    • Hypertension    • Prostate disease    • Sleep apnea    • Third degree heart block        Past Surgical History:   Procedure Laterality Date   • CARDIAC ELECTROPHYSIOLOGY PROCEDURE N/A 2022    Procedure: Temporary Pacemaker;  Surgeon: Madelaine Calles MD;  Location: [facility] INVASIVE LOCATION;  Service: Cardiology;  Laterality: N/A;   • CARDIAC ELECTROPHYSIOLOGY PROCEDURE N/A 04/15/2022    Procedure: Pacemaker DC new;  Surgeon: Bola Shetty MD;  Location: [facility] INVASIVE LOCATION;  Service: Cardiology;  Laterality: N/A;   • COLON SURGERY     • COLONOSCOPY     • INSERT / REPLACE / REMOVE PACEMAKER  4/15/2022   • JOINT REPLACEMENT         Social History     Socioeconomic History   • Marital status:    Tobacco Use   • Smoking status: Never   • Smokeless tobacco: Never   Vaping Use   • Vaping Use: Never used   Substance and Sexual Activity   • Alcohol use: Yes     Alcohol/week: 4.0 standard drinks     Types: 4 Cans of beer per week   • Drug use: Never   • Sexual activity: Not Currently     Partners:  "Female       History reviewed. No pertinent family history.    Review of Systems   Constitutional: Negative for chills, fever and malaise/fatigue.   Cardiovascular: Negative for chest pain, dyspnea on exertion, leg swelling, near-syncope, orthopnea, palpitations, paroxysmal nocturnal dyspnea and syncope.   Respiratory: Negative for cough and shortness of breath.    Musculoskeletal: Negative for joint pain, joint swelling and myalgias.   Gastrointestinal: Negative for abdominal pain, diarrhea, melena, nausea and vomiting.   Genitourinary: Negative for frequency and hematuria.   Neurological: Negative for light-headedness, numbness, paresthesias and seizures.   Allergic/Immunologic: Negative.    All other systems reviewed and are negative.      No Known Allergies      Current Outpatient Medications:   •  amLODIPine (NORVASC) 5 MG tablet, Take 1 tablet by mouth Daily., Disp: , Rfl:   •  atorvastatin (LIPITOR) 20 MG tablet, Take 1 tablet by mouth 2 (Two) Times a Day., Disp: , Rfl:   •  carvedilol (COREG) 12.5 MG tablet, Take 1 tablet by mouth Every 12 (Twelve) Hours., Disp: 90 tablet, Rfl: 3  •  finasteride (PROSCAR) 5 MG tablet, Take 1 tablet by mouth Daily., Disp: , Rfl:   •  hydroCHLOROthiazide (HYDRODIURIL) 25 MG tablet, Take 1 tablet by mouth Daily., Disp: , Rfl:   •  triamcinolone (KENALOG) 0.1 % cream, As Needed., Disp: , Rfl:   •  valsartan (DIOVAN) 160 MG tablet, Take 1 tablet by mouth Daily., Disp: , Rfl:       Objective:     Vitals:    04/05/23 1227   BP: 128/70   Pulse: 63   Weight: 101 kg (222 lb)   Height: 172.7 cm (68\")     Body mass index is 33.75 kg/m².    PHYSICAL EXAM:    Vitals Reviewed.   General Appearance: No acute distress, well developed and well nourished.   Eyes: Conjunctiva and lids: No erythema, swelling, or discharge. Sclera non-icteric.   HENT: Atraumatic, normocephalic. External eyes, ears, and nose normal.   Respiratory: No signs of respiratory distress. Respiration rhythm and depth " normal.   Clear to auscultation. No rales, crackles, rhonchi, or wheezing auscultated.   Cardiovascular:  Heart Rate and Rhythm: Normal, Heart Sounds: Normal S1 and S2. No S3 or S4 noted.  Murmurs: No murmurs noted. No rubs, thrills, or gallops.   Arterial Pulses:  Posterior tibialis and dorsalis pedis pulses normal.   Lower Extremities: No edema noted.  Gastrointestinal:  Abdomen soft, non-distended, non-tender.   Musculoskeletal: Normal movement of extremities  Skin: Warm and dry.   Psychiatric: Patient alert and oriented to person, place, and time. Speech and behavior appropriate. Normal mood and affect.       ECG 12 Lead    Date/Time: 4/5/2023 12:38 PM  Performed by: Karine Wakefield APRN  Authorized by: Karine Wakefield APRN   Comparison: compared with previous ECG   Similar to previous ECG  BPM: 63  Pacing: atrial sensed rhythm, ventricular paced rhythm and 100% capture            Assessment:       Diagnosis Plan   1. Complete heart block        2. Presence of cardiac pacemaker        3. Primary hypertension               Plan:       1.-2.  Complete heart block, status post permanent pacemaker.  Normal testing and function.    3.  Hypertension, controlled.    Follow-up with Dr. Shetty in 6 months with device check.    As always, it has been a pleasure to participate in your patient's care.      Sincerely,         KAREN Hand

## 2023-06-09 ENCOUNTER — TRANSCRIBE ORDERS (OUTPATIENT)
Dept: ADMINISTRATIVE | Facility: HOSPITAL | Age: 73
End: 2023-06-09
Payer: MEDICARE

## 2023-06-09 DIAGNOSIS — M54.50 LUMBAR BACK PAIN: Primary | ICD-10-CM

## 2023-06-16 ENCOUNTER — HOSPITAL ENCOUNTER (OUTPATIENT)
Dept: CT IMAGING | Facility: HOSPITAL | Age: 73
Discharge: HOME | End: 2023-06-16
Payer: MEDICARE

## 2023-06-16 DIAGNOSIS — M54.50 LUMBAR BACK PAIN: ICD-10-CM

## 2023-06-16 PROCEDURE — 72131 CT LUMBAR SPINE W/O DYE: CPT

## 2023-07-19 ENCOUNTER — HOSPITAL ENCOUNTER (OUTPATIENT)
Facility: HOSPITAL | Age: 73
Setting detail: HOSPITAL OUTPATIENT SURGERY
Discharge: HOME | End: 2023-07-19
Attending: SURGERY | Admitting: SURGERY
Payer: MEDICARE

## 2023-07-19 VITALS
RESPIRATION RATE: 16 BRPM | OXYGEN SATURATION: 96 % | HEIGHT: 68 IN | HEART RATE: 71 BPM | SYSTOLIC BLOOD PRESSURE: 138 MMHG | WEIGHT: 217 LBS | BODY MASS INDEX: 32.89 KG/M2 | DIASTOLIC BLOOD PRESSURE: 77 MMHG

## 2023-07-19 PROCEDURE — G0105 COLORECTAL SCRN; HI RISK IND: HCPCS | Performed by: SURGERY

## 2023-07-19 RX ORDER — SODIUM CHLORIDE, SODIUM LACTATE, POTASSIUM CHLORIDE, CALCIUM CHLORIDE 600; 310; 30; 20 MG/100ML; MG/100ML; MG/100ML; MG/100ML
1000 INJECTION, SOLUTION INTRAVENOUS CONTINUOUS
Status: DISCONTINUED | OUTPATIENT
Start: 2023-07-19 | End: 2023-07-19 | Stop reason: HOSPADM

## 2023-07-19 RX ADMIN — SODIUM CHLORIDE, POTASSIUM CHLORIDE, SODIUM LACTATE AND CALCIUM CHLORIDE 1000 ML: 600; 310; 30; 20 INJECTION, SOLUTION INTRAVENOUS at 07:46

## 2023-07-19 NOTE — DISCHARGE INSTRUCTIONS
For the next 24 hours patient needs to be with a responsible adult.    For 24 hours DO NOT drive, operate machinery, appliances, drink alcohol, make important decisions or sign legal documents.    Start with a light or bland diet if you are feeling sick to your stomach otherwise advance to regular diet as tolerated.    Follow recommendations on procedure report if provided by your doctor.    Call Dr Gimenez for problems [phone]    Problems may include but not limited to: large amounts of bleeding, trouble breathing, repeated vomiting, severe unrelieved pain, fever or chills.

## 2023-07-19 NOTE — H&P
CC: Surveillance    HPI: 73-year-old gentleman with personal history of colon cancer and personal history of adenomatous polyps, presents for surveillance colonoscopy.    PMH, PSH, MEDS AND ALLERGIES reviewed and reconciled in  EPIC    PHYSICAL EXAM:  Constitutional:  awake, alert, no acute distress  VS: afebrile, VSS  Respiratory:  normal inspiratory effort  Cardiovascular: regular rate  Gastrointestinal: Soft    ROS:  relevant systems negative other than any presenting complaints    ASSESSMENT:    Surveillance secondary to personal history of colon cancer status postsurgery in 2004 and personal history of adenomatous polyps    PLAN:  Colonoscopy    Yossi Gimenez M.D.

## 2023-07-19 NOTE — OP NOTE
PREOPERATIVE DIAGNOSIS:  High risk screening secondary to personal history of colon cancer status post right colectomy 2004 and personal history of adenomatous polyps    POSTOPERATIVE DIAGNOSIS AND FINDINGS:  Normal    PROCEDURE:  Colonoscopy to anastomosis    SURGEON:  Yossi Gimenez MD    ANESTHESIA:  MAC    SPECIMEN(S):  none    DESCRIPTION:  In decubitus position digital rectal exam was normal. Colonoscope inserted under direct visualization of lumen to ileocolic anastomosis.  Terminal ileum was grossly normal.  Scope was slowly withdrawn circumferentially examining all mucosal surfaces.  Bowel preparation was good.  There were no mucosal abnormalities.  Tolerated well.    RECOMMENDATION FOR FUTURE SURVEILLANCE:  5 years    Yossi Gimenez M.D.

## 2023-09-25 ENCOUNTER — TELEPHONE (OUTPATIENT)
Dept: CARDIOLOGY | Facility: CLINIC | Age: 73
End: 2023-09-25
Payer: MEDICARE

## 2023-09-25 NOTE — TELEPHONE ENCOUNTER
Caller: Reginald Roth    Relationship: Self    Best call back number:  [phone]      PATIENT IS ADVISING THAT [employer] MAY CONTINUE TO COVER HIS VISITS IF A LETTER IS SENT STATING HE NEEDS CONTINUED CARE BECAUSE OF HIS PACEMAKER BEING MONITORED BY [employer].     FAX: [phone]

## 2023-09-26 NOTE — TELEPHONE ENCOUNTER
Called pt LVM stating we are trying to figure out our end on continuation of care with [employer] on our end...Yanira

## 2023-11-22 ENCOUNTER — OFFICE VISIT (OUTPATIENT)
Age: 73
End: 2023-11-22
Payer: MEDICARE

## 2023-11-22 ENCOUNTER — CLINICAL SUPPORT NO REQUIREMENTS (OUTPATIENT)
Age: 73
End: 2023-11-22
Payer: MEDICARE

## 2023-11-22 VITALS
SYSTOLIC BLOOD PRESSURE: 146 MMHG | WEIGHT: 223 LBS | HEIGHT: 68 IN | BODY MASS INDEX: 33.8 KG/M2 | DIASTOLIC BLOOD PRESSURE: 80 MMHG | HEART RATE: 91 BPM

## 2023-11-22 DIAGNOSIS — Z95.0 PRESENCE OF CARDIAC PACEMAKER: ICD-10-CM

## 2023-11-22 DIAGNOSIS — I44.2 COMPLETE HEART BLOCK: Primary | ICD-10-CM

## 2023-11-22 DIAGNOSIS — I10 PRIMARY HYPERTENSION: ICD-10-CM

## 2023-11-22 PROCEDURE — 93000 ELECTROCARDIOGRAM COMPLETE: CPT | Performed by: INTERNAL MEDICINE

## 2023-11-22 PROCEDURE — 1159F MED LIST DOCD IN RCRD: CPT | Performed by: INTERNAL MEDICINE

## 2023-11-22 PROCEDURE — 3079F DIAST BP 80-89 MM HG: CPT | Performed by: INTERNAL MEDICINE

## 2023-11-22 PROCEDURE — 1160F RVW MEDS BY RX/DR IN RCRD: CPT | Performed by: INTERNAL MEDICINE

## 2023-11-22 PROCEDURE — 99214 OFFICE O/P EST MOD 30 MIN: CPT | Performed by: INTERNAL MEDICINE

## 2023-11-22 PROCEDURE — 3077F SYST BP >= 140 MM HG: CPT | Performed by: INTERNAL MEDICINE

## 2023-11-22 NOTE — PROGRESS NOTES
Date of Office Visit: 2023  Encounter Provider: Bola Shetty MD  Place of Service: [facility] CARDIOLOGY  Patient Name: Reginald Roth  : 1950    Subjective:     Encounter Date:2023      Patient ID: Reginald Roth is a 73 y.o. male who has a cc of  complete av block and I did a lbb area pacer in 2022        The patient had a good year.   No anginal chest pain,   No sig meier,   No soa,   No fainting,  No orthostasis.   No edema.   Exercise tolerance: good     He has a lot of back pain     There have been no hospital admission since the last visit.     There have been no bleeding events.       Past Medical History:   Diagnosis Date    Acute congestive heart failure     Cancer colon    Complete heart block     Hyperlipidemia     Hypertension     Prostate disease     Sleep apnea     Third degree heart block        Social History     Socioeconomic History    Marital status:    Tobacco Use    Smoking status: Never    Smokeless tobacco: Never   Vaping Use    Vaping Use: Never used   Substance and Sexual Activity    Alcohol use: Yes     Alcohol/week: 2.0 standard drinks of alcohol     Types: 2 Cans of beer per week    Drug use: Never    Sexual activity: Not Currently     Partners: Female       No family history on file.    Review of Systems   Constitutional: Negative for fever and night sweats.   HENT:  Negative for ear pain and stridor.    Eyes:  Negative for discharge and visual halos.   Cardiovascular:  Negative for cyanosis.   Respiratory:  Negative for hemoptysis and sputum production.    Hematologic/Lymphatic: Negative for adenopathy.   Skin:  Negative for nail changes and unusual hair distribution.   Musculoskeletal:  Positive for arthritis and back pain. Negative for gout and joint swelling.   Gastrointestinal:  Negative for bowel incontinence and flatus.   Genitourinary:  Negative for dysuria and flank pain.   Neurological:  Negative for seizures and tremors.  "  Psychiatric/Behavioral:  Negative for altered mental status. The patient is not nervous/anxious.             Objective:     Vitals:    11/22/23 0853   BP: 146/80   Pulse: 91   Weight: 101 kg (223 lb)   Height: 172.7 cm (68\")         Eyes:      General:         Right eye: No discharge.         Left eye: No discharge.   HENT:      Head: Normocephalic and atraumatic.   Neck:      Thyroid: No thyromegaly.      Vascular: No JVD.   Pulmonary:      Effort: Pulmonary effort is normal.      Breath sounds: Normal breath sounds. No rales.   Cardiovascular:      Normal rate. Regular rhythm.      No gallop.    Edema:     Peripheral edema absent.   Abdominal:      General: Bowel sounds are normal.      Palpations: Abdomen is soft.      Tenderness: There is no abdominal tenderness.   Musculoskeletal: Normal range of motion.         General: No deformity. Skin:     General: Skin is warm and dry.      Findings: No erythema.   Neurological:      Mental Status: Alert and oriented to person, place, and time.      Motor: Normal muscle tone.   Psychiatric:         Behavior: Behavior normal.         Thought Content: Thought content normal.           ECG 12 Lead    Date/Time: 11/22/2023 9:46 AM  Performed by: Bola Shetty MD    Authorized by: Bola Shetty MD  Comparison: compared with previous ECG   Similar to previous ECG  Rhythm: sinus rhythm and paced          Lab Review:       Assessment:          Diagnosis Plan   1. Complete heart block        2. Presence of cardiac pacemaker        3. Primary hypertension               Plan:     All is good    I reviewed the pacemaker/ICD tracings and the pacing and sensing parameters are normal.  AF burden is 0     QRS looks great       "

## 2024-04-09 ENCOUNTER — TELEPHONE (OUTPATIENT)
Dept: CARDIOLOGY | Facility: CLINIC | Age: 74
End: 2024-04-09
Payer: MEDICARE

## 2024-04-09 NOTE — TELEPHONE ENCOUNTER
Caller: Reginald Roth    Relationship: Self    Best call back number:  [phone]      PATIENT CALLED IN WITH ADVISEMENT FROM PAIN MANAGEMENT TO GET AN APPT TO BE SEEN.    HE HAS BEEN HAVING PAIN IN HIS NEXT AND FROM AN XRAY THEY ORDERED, NOTICED HE HAD A BLOCKAGE IN HIS CAROTID ARTERY.

## 2024-04-10 NOTE — TELEPHONE ENCOUNTER
Pt called back. He said his pain management doctor's name is Lencho Renner and the APRN is Felecia Carlos. They took several xrays at their office for his neck pain. The office phone number is [phone] and fax is [phone].    Iris,    Would you be able to obtain xrays?    Thank you,    Lisa Leon, RN  [place] [facility]  04/10/24 08:52 EDT

## 2024-04-10 NOTE — TELEPHONE ENCOUNTER
Can you get some more details of what imaging he really had because xrays do not show carotid disease

## 2024-04-30 ENCOUNTER — TELEPHONE (OUTPATIENT)
Age: 74
End: 2024-04-30
Payer: MEDICARE

## 2024-04-30 NOTE — TELEPHONE ENCOUNTER
"He had called secondary to having xray imaging on his neck for pain issues---finally got records to review--\"incidental finding notable carotid calcifications\"     It also says his cardiologist is Dr. Fabio Campos in [facility]--not sure if this correct    Called him to discuss, no answer left message, to call back but also has upcoming appt 5/24 and can discuss then    He had carotid US in 1/2022 which showed <50% stenosis bilaterally and is currently on a statin. So not sure any further imaging or intervention is needed unless he has symptoms.   "

## 2024-05-24 ENCOUNTER — CLINICAL SUPPORT NO REQUIREMENTS (OUTPATIENT)
Age: 74
End: 2024-05-24
Payer: MEDICARE

## 2024-05-24 ENCOUNTER — OFFICE VISIT (OUTPATIENT)
Age: 74
End: 2024-05-24
Payer: MEDICARE

## 2024-05-24 ENCOUNTER — TELEPHONE (OUTPATIENT)
Age: 74
End: 2024-05-24
Payer: MEDICARE

## 2024-05-24 VITALS
HEIGHT: 68 IN | HEART RATE: 77 BPM | WEIGHT: 222 LBS | BODY MASS INDEX: 33.65 KG/M2 | DIASTOLIC BLOOD PRESSURE: 60 MMHG | SYSTOLIC BLOOD PRESSURE: 140 MMHG

## 2024-05-24 DIAGNOSIS — I44.2 COMPLETE HEART BLOCK: Primary | ICD-10-CM

## 2024-05-24 DIAGNOSIS — Z95.0 PRESENCE OF CARDIAC PACEMAKER: ICD-10-CM

## 2024-05-24 DIAGNOSIS — I65.21 CAROTID ARTERY STENOSIS, SYMPTOMATIC, RIGHT: ICD-10-CM

## 2024-05-24 DIAGNOSIS — I65.23 CAROTID STENOSIS, ASYMPTOMATIC, BILATERAL: ICD-10-CM

## 2024-05-24 PROCEDURE — 93000 ELECTROCARDIOGRAM COMPLETE: CPT | Performed by: NURSE PRACTITIONER

## 2024-05-24 PROCEDURE — 1159F MED LIST DOCD IN RCRD: CPT | Performed by: NURSE PRACTITIONER

## 2024-05-24 PROCEDURE — 1160F RVW MEDS BY RX/DR IN RCRD: CPT | Performed by: NURSE PRACTITIONER

## 2024-05-24 PROCEDURE — 99214 OFFICE O/P EST MOD 30 MIN: CPT | Performed by: NURSE PRACTITIONER

## 2024-05-24 PROCEDURE — 3078F DIAST BP <80 MM HG: CPT | Performed by: NURSE PRACTITIONER

## 2024-05-24 PROCEDURE — 3077F SYST BP >= 140 MM HG: CPT | Performed by: NURSE PRACTITIONER

## 2024-05-24 NOTE — PROGRESS NOTES
Date of Office Visit: 2024  Encounter Provider: KAREN Cobb  Place of Service: [facility] CARDIOLOGY  Patient Name: Reginald Roth  :1950    Chief Complaint   Patient presents with    complete heart block    Pacemaker Check   :     HPI: Reginald Roth is a 74 y.o. male who follows with Dr. Shetty ---[facility], s/p PPM 2022.     Presents for routine follow-up and device check.    He is doing okay from a cardiac standpoint.  He has no complaints of chest pain, dyspnea, PND, orthopnea or edema.    Is currently seeing pain management for neck pain and they did x-rays which showed some calcification of his carotid arteries and recommended that he follow-up with cardiology.    He had ultrasound of his carotids in 2022 which showed less than 50% bilaterally, he has not really had any symptoms but we discussed and I will order a repeat ultrasound of his carotids--- he lives in [place] and ask if there was any follow-up in [place], we discussed that we could probably find him a cardiologist in someone who would check his pacemaker in [place] or close to their but then he said if he ever needed the generator change he would not want to have it done at [facility] he would want to come here so I just recommended that he continue to follow with us.  He was okay with that.    I will try to get the US of carotids at [facility].     Device interrogation today shows normal testing and function.  He is atrially paced 62%, V paced 99.8%.  He has not had any arrhythmia episodes.     Past Medical History:   Diagnosis Date    Acute congestive heart failure     Cancer colon    Complete heart block     Hyperlipidemia     Hypertension     Prostate disease     Sleep apnea     Third degree heart block        Past Surgical History:   Procedure Laterality Date    CARDIAC ELECTROPHYSIOLOGY PROCEDURE N/A 2022    Procedure: Temporary Pacemaker;  Surgeon: Madelaine Calles MD;  Location:   [facility];  Service: Cardiology;  Laterality: N/A;    CARDIAC ELECTROPHYSIOLOGY PROCEDURE N/A 04/15/2022    Procedure: Pacemaker DC new;  Surgeon: Bola Shetty MD;  Location:  [facility];  Service: Cardiology;  Laterality: N/A;    COLON SURGERY      COLONOSCOPY      COLONOSCOPY N/A 7/19/2023    Procedure: COLONOSCOPY TO ILEOCOLIC ANASTOMOSIS;  Surgeon: Yossi Gimenez MD;  Location: [facility] ENDOSCOPY;  Service: General;  Laterality: N/A;  HX COLON CANCER/NORMAL POSTOP ANATOMY    INSERT / REPLACE / REMOVE PACEMAKER  4/15/2022    JOINT REPLACEMENT         Social History     Socioeconomic History    Marital status:    Tobacco Use    Smoking status: Never    Smokeless tobacco: Never   Vaping Use    Vaping status: Never Used   Substance and Sexual Activity    Alcohol use: Yes     Alcohol/week: 2.0 standard drinks of alcohol     Types: 2 Cans of beer per week    Drug use: Never    Sexual activity: Not Currently     Partners: Female       No family history on file.    Review of Systems   Constitutional: Negative for chills, fever and malaise/fatigue.   Cardiovascular:  Negative for chest pain, dyspnea on exertion, leg swelling, near-syncope, orthopnea, palpitations, paroxysmal nocturnal dyspnea and syncope.   Respiratory:  Negative for cough and shortness of breath.    Musculoskeletal:  Negative for joint pain, joint swelling and myalgias.   Gastrointestinal:  Negative for abdominal pain, diarrhea, melena, nausea and vomiting.   Genitourinary:  Negative for frequency and hematuria.   Neurological:  Negative for light-headedness, numbness, paresthesias and seizures.   Allergic/Immunologic: Negative.    All other systems reviewed and are negative.      No Known Allergies      Current Outpatient Medications:     amLODIPine (NORVASC) 5 MG tablet, Take 1 tablet by mouth Daily., Disp: , Rfl:     atorvastatin (LIPITOR) 20 MG tablet, Take 1 tablet by mouth 2 (Two) Times a Day.,  "Disp: , Rfl:     carvedilol (COREG) 12.5 MG tablet, Take 1 tablet by mouth Every 12 (Twelve) Hours., Disp: 90 tablet, Rfl: 3    finasteride (PROSCAR) 5 MG tablet, Take 1 tablet by mouth Daily., Disp: , Rfl:     hydroCHLOROthiazide (HYDRODIURIL) 25 MG tablet, Take 1 tablet by mouth Daily., Disp: , Rfl:     triamcinolone (KENALOG) 0.1 % cream, As Needed., Disp: , Rfl:     valsartan (DIOVAN) 160 MG tablet, Take 1 tablet by mouth Daily., Disp: , Rfl:       Objective:     Vitals:    05/24/24 0828   BP: 140/60   Pulse: 77   Weight: 101 kg (222 lb)   Height: 172.7 cm (68\")     Body mass index is 33.75 kg/m².    PHYSICAL EXAM:    Vitals Reviewed.   General Appearance: No acute distress, well developed and well nourished.   HENT: Atraumatic, normocephalic. External eyes, ears, and nose normal.   Respiratory: No signs of respiratory distress. Respiration rhythm and depth normal.   Clear to auscultation. No rales, crackles, rhonchi, or wheezing auscultated.   Cardiovascular:  Heart Rate and Rhythm: Normal, Heart Sounds: S1 and S2.   Murmurs: No murmurs noted. No rubs, thrills, or gallops.   Lower Extremities: No edema noted.  Musculoskeletal: Normal movement of extremities  Skin: Warm and dry.   Psychiatric: Patient alert and oriented to person, place, and time. Speech and behavior appropriate. Normal mood and affect.       ECG 12 Lead    Date/Time: 5/24/2024 9:32 AM  Performed by: Karine Wakefield APRN    Authorized by: Karine Wakefield APRN  Comparison: compared with previous ECG   Similar to previous ECG  Rhythm: paced  BPM: 77  Pacing: dual chamber pacing and 100% capture          Assessment:       Diagnosis Plan   1. Complete heart block        2. Presence of cardiac pacemaker               Plan:       1-2. CHB, s/p PPM--- normal testing and function.    3.  Carotid stenosis, less than 50% by ultrasound over 2 years ago but recent imaging that showed calcium in that area so will go ahead and repeat an ultrasound of the " carotid.  See if we can get this scheduled at [place] a for him since that is closer to where he lives. He is on a statin per his PCP.     Follow up with Dr. Shetty in 6 months w/device check. Will call with US results when available.     As always, it has been a pleasure to participate in your patient's care.      Sincerely,         KAREN Hand

## 2024-06-21 ENCOUNTER — TELEPHONE (OUTPATIENT)
Age: 74
End: 2024-06-21
Payer: MEDICARE

## 2024-06-21 NOTE — TELEPHONE ENCOUNTER
MRI Clearance form completed and faxed to Dr. Townsend at [facility] at [phone].    Confirmation received.

## 2024-12-11 ENCOUNTER — CLINICAL SUPPORT NO REQUIREMENTS (OUTPATIENT)
Age: 74
End: 2024-12-11
Payer: MEDICARE

## 2024-12-11 ENCOUNTER — OFFICE VISIT (OUTPATIENT)
Age: 74
End: 2024-12-11
Payer: MEDICARE

## 2024-12-11 VITALS
BODY MASS INDEX: 33.13 KG/M2 | SYSTOLIC BLOOD PRESSURE: 148 MMHG | DIASTOLIC BLOOD PRESSURE: 64 MMHG | WEIGHT: 218.6 LBS | HEART RATE: 79 BPM | HEIGHT: 68 IN

## 2024-12-11 DIAGNOSIS — I44.2 COMPLETE HEART BLOCK: Primary | ICD-10-CM

## 2024-12-11 DIAGNOSIS — Z95.0 PRESENCE OF CARDIAC PACEMAKER: Primary | ICD-10-CM

## 2024-12-11 DIAGNOSIS — I10 PRIMARY HYPERTENSION: ICD-10-CM

## 2024-12-11 DIAGNOSIS — Z95.0 PRESENCE OF CARDIAC PACEMAKER: ICD-10-CM

## 2024-12-11 PROCEDURE — 99214 OFFICE O/P EST MOD 30 MIN: CPT | Performed by: INTERNAL MEDICINE

## 2024-12-11 PROCEDURE — 3077F SYST BP >= 140 MM HG: CPT | Performed by: INTERNAL MEDICINE

## 2024-12-11 PROCEDURE — 3078F DIAST BP <80 MM HG: CPT | Performed by: INTERNAL MEDICINE

## 2024-12-11 PROCEDURE — 93000 ELECTROCARDIOGRAM COMPLETE: CPT | Performed by: INTERNAL MEDICINE

## 2024-12-11 RX ORDER — GABAPENTIN 300 MG/1
1 CAPSULE ORAL 3 TIMES DAILY
COMMUNITY
Start: 2024-11-27

## 2024-12-11 NOTE — PROGRESS NOTES
Date of Office Visit: 2024  Encounter Provider: Bola Shetty MD  Place of Service: [facility] CARDIOLOGY  Patient Name: Reginald Roth  : 1950    Subjective:     Encounter Date:2024      Patient ID: Reginald Roth is a 74 y.o. male who has a cc of  complete heart block and HTN       The patient had a good year. X for back and joint pain.   No anginal chest pain,   No sig meier,   No soa,   No fainting,  No orthostasis.   No edema.   Exercise tolerance: limited by arthritis     There have been no hospital admission since the last visit.     There have been no bleeding events.       Past Medical History:   Diagnosis Date    Acute congestive heart failure     Cancer colon    Complete heart block     Hyperlipidemia     Hypertension     Prostate disease     Sleep apnea     Third degree heart block        Social History     Socioeconomic History    Marital status:    Tobacco Use    Smoking status: Never    Smokeless tobacco: Never   Vaping Use    Vaping status: Never Used   Substance and Sexual Activity    Alcohol use: Yes     Alcohol/week: 2.0 standard drinks of alcohol     Types: 2 Cans of beer per week    Drug use: Never    Sexual activity: Not Currently     Partners: Female       No family history on file.    Review of Systems   Constitutional: Negative for fever and night sweats.   HENT:  Negative for ear pain and stridor.    Eyes:  Negative for discharge and visual halos.   Cardiovascular:  Negative for cyanosis.   Respiratory:  Negative for hemoptysis and sputum production.    Hematologic/Lymphatic: Negative for adenopathy.   Skin:  Negative for nail changes and unusual hair distribution.   Musculoskeletal:  Positive for arthritis, back pain and joint pain. Negative for gout and joint swelling.   Gastrointestinal:  Negative for bowel incontinence and flatus.   Genitourinary:  Negative for dysuria and flank pain.   Neurological:  Negative for seizures and tremors.  "  Psychiatric/Behavioral:  Negative for altered mental status. The patient is not nervous/anxious.             Objective:     Vitals:    12/11/24 0849   BP: 148/64   BP Location: Left arm   Patient Position: Sitting   Pulse: 79   Weight: 99.2 kg (218 lb 9.6 oz)   Height: 172.7 cm (68\")         Eyes:      General:         Right eye: No discharge.         Left eye: No discharge.   HENT:      Head: Normocephalic and atraumatic.   Neck:      Thyroid: No thyromegaly.      Vascular: No JVD.   Pulmonary:      Effort: Pulmonary effort is normal.      Breath sounds: Normal breath sounds. No rales.   Cardiovascular:      Normal rate. Regular rhythm.      No gallop.    Edema:     Peripheral edema absent.   Abdominal:      General: Bowel sounds are normal.      Palpations: Abdomen is soft.      Tenderness: There is no abdominal tenderness.   Musculoskeletal: Normal range of motion.         General: No deformity. Skin:     General: Skin is warm and dry.      Findings: No erythema.   Neurological:      Mental Status: Alert and oriented to person, place, and time.      Motor: Normal muscle tone.   Psychiatric:         Behavior: Behavior normal.         Thought Content: Thought content normal.           ECG 12 Lead    Date/Time: 12/11/2024 9:49 AM  Performed by: Bola Shetty MD    Authorized by: Bola Shetty MD  Comparison: compared with previous ECG   Similar to previous ECG  Rhythm: sinus rhythm and paced          Lab Review:       Assessment:          Diagnosis Plan   1. Complete heart block        2. Presence of cardiac pacemaker        3. Primary hypertension               Plan:     I reviewed the pacemaker/ICD tracings and the pacing and sensing parameters are normal.  AF burden is 0    HTN -- he is pretty well controlled.     "

## 2025-02-09 PROCEDURE — 93296 REM INTERROG EVL PM/IDS: CPT | Performed by: INTERNAL MEDICINE

## 2025-02-09 PROCEDURE — 93294 REM INTERROG EVL PM/LDLS PM: CPT | Performed by: INTERNAL MEDICINE

## 2025-05-15 ENCOUNTER — TELEPHONE (OUTPATIENT)
Age: 75
End: 2025-05-15

## 2025-05-15 NOTE — TELEPHONE ENCOUNTER
The pt's MDT pacemaker had 2 events recorded with 1 AT/AF labeled and 1 VT-NS labeled detailed below- please review  Kindly,   Yaima Patel Device RN     1 AT/AF labeled on 4/14 09:09 lasting 1 min 20 seconds - EGM appears to show atrial noise/interference.      1  VT-NS labeled event on 3/23 04:38 with avg V rate 181 bpm- EGM shows V>A conducted VT-NS lasting 16 beats.

## 2025-05-23 ENCOUNTER — OFFICE VISIT (OUTPATIENT)
Dept: NEUROSURGERY | Facility: CLINIC | Age: 75
End: 2025-05-23
Payer: MEDICARE

## 2025-05-23 VITALS
BODY MASS INDEX: 34.25 KG/M2 | WEIGHT: 226 LBS | OXYGEN SATURATION: 95 % | SYSTOLIC BLOOD PRESSURE: 148 MMHG | HEIGHT: 68 IN | HEART RATE: 71 BPM | DIASTOLIC BLOOD PRESSURE: 60 MMHG

## 2025-05-23 DIAGNOSIS — M47.816 LUMBAR SPONDYLOSIS: Primary | ICD-10-CM

## 2025-05-23 RX ORDER — GLYBURIDE 2.5 MG/1
2.5 TABLET ORAL
COMMUNITY
Start: 2025-05-22

## 2025-05-23 NOTE — PROGRESS NOTES
Patient being seen for today for Follow-up (Insurance will not pay for his pain management without patient being reassessed./Degenerative disc disease lumbar, Sciatica from lumber radiates to right leg. Cervical disc compression. )  .    Subjective    Reginald Roth is a 75 y.o. male that presents with Follow-up (Insurance will not pay for his pain management without patient being reassessed./Degenerative disc disease lumbar, Sciatica from lumber radiates to right leg. Cervical disc compression. )  .    HPI  Previously: Last seen on 7/20/2023 for pain in the back and down both legs just past the knees.  At that time there was no significant stenosis in the lumbar spine on CT lumbar spine from 6/16/2023.  We discussed retrolisthesis at multiple levels and consideration for possible flexion-extension x-rays to assess for instability in the spine which he was agreeable to.  We discussed considerations for physical therapy or pain management for spinal injections which she was deferring at that time.  Later he completed x-ray and was notified of changes without instability, there was a referral to [facility] at that time for pain management.    Today: He reports pain in the lower back and right leg posteriorly to about the knee. He rates his pain 8/10 today. He reports numbness and tingling in the right leg with a little bit of weakness in the right leg. He denies loss of bowel or bladder control.    He has been undergoing spinal injection treatment with pain management. He was having 3 months of benefit from his epidural injections, but more recently they were denied by insurance who reportedly requesting re-evaluation by neurosurgery prior to considering further epidurals. He reports having RFA treatments with some benefit.    He takes Gabapentin which is somewhat effective.    He reports doing home exercises for the past 6 months which sometimes offers minimal benefit, sometimes exacerbates his  pain.     reports that he has never smoked. He has never been exposed to tobacco smoke. He has never used smokeless tobacco.    Review of Systems   Musculoskeletal:  Positive for back pain.        Right leg pain to posterior knee       Objective   Vitals:    05/23/25 0958   BP: 148/60   Pulse: 71   SpO2: 95%        Physical Exam  Constitutional:       Appearance: Normal appearance. He is obese.   Pulmonary:      Effort: Pulmonary effort is normal.   Musculoskeletal:         General: Tenderness (right > left lumbar areas) present.      Comments: SLR positive on the right   Neurological:      General: No focal deficit present.      Mental Status: He is alert and oriented to person, place, and time.      Sensory: No sensory deficit.      Motor: No weakness.      Deep Tendon Reflexes: Reflexes normal.   Psychiatric:         Mood and Affect: Mood normal.         Behavior: Behavior normal.          Result Review   I have independently interpreted the x-ray of the lumbar spine with flexion-extension views from 7/20/2023 which shows no instability.     Assessment and Plan {CC Problem List  Visit Diagnosis  ROS  Review (Popup)  [facility] Maintenance  Quality  BestPractice  Medications  SmartSets  SnapShot Encounters  Media :23}   Diagnoses and all orders for this visit:    1. Lumbar spondylosis (Primary)  -     CT Lumbar Spine Without Contrast; Future    He has pain in the back and right leg to the posterior. He reports having benefit from pain management treatment with RFAs and epidurals, but recently epidurals were not approved as an ongoing treatment and his insurance reportedly requested a neurosurgery evaluation prior to going more.    He has previous imaging from 2023 showing multilevel spondylosis, but not major stenosis.    I do not expect physical therapy to be effective for him. He has been doing home exercises for the past 6 months and this has not offered significant benefit and sometimes exacerbates his  pain.    Considering the failure of this conservative treatment and his ongoing back and leg pain, I will order a new CT of the lumbar spine without contrast to evaluate further and help guide ongoing treatment.    I would have him continue RFA treatment with pain management while we evaluate further, as he finds that helpful. I expect he could see continued benefit from ongoing epidural treatment, but we will evaluate further for possible surgical approach prior to recommending that treatment.    Follow Up {Instructions Charge Capture  Follow-up Communications :23}   Return in about 4 weeks (around 6/20/2025).

## 2025-06-02 ENCOUNTER — TELEPHONE (OUTPATIENT)
Age: 75
End: 2025-06-02
Payer: MEDICARE

## 2025-06-02 NOTE — TELEPHONE ENCOUNTER
Received a fax stating pt is scheduling a lumbar radio frequency ablation and are requesting to put pacer in asynchronous mode.  Is pt ok to move forward with procedure?  They are not requesting to hold A/C.      I have attached the pacer dept as well...Yanira

## 2025-06-02 NOTE — TELEPHONE ENCOUNTER
Pt has a [employer] dual chamber pacemaker.     Currently programmed DDDR with LRL of 60 bpm.     Pt is dependent per hx.    If it is okay to proceed per Dr. Shetty, they need to make sure they secure a magnet over the device to prevent ROMEL/noise from causing oversensing/underpacing. This puts device in asynchronous mode of DOO at 85 bpm. Device will resume original settings when magnet is removed.     Number to [facility] if they need it is: [phone]

## 2025-06-04 NOTE — TELEPHONE ENCOUNTER
Caller: [facility]    Best call back number: [phone]     What was the call regarding: BRANDON WITH [facility] FOLLOWING UP ON CLEARANCE REQUEST - SHE WAS CHECKING STATUS,   CONFIRMED FAX: [phone]

## 2025-06-10 ENCOUNTER — HOSPITAL ENCOUNTER (OUTPATIENT)
Dept: CT IMAGING | Facility: HOSPITAL | Age: 75
Discharge: HOME | End: 2025-06-10
Admitting: PHYSICIAN ASSISTANT
Payer: MEDICARE

## 2025-06-10 DIAGNOSIS — M47.816 LUMBAR SPONDYLOSIS: ICD-10-CM

## 2025-06-10 PROCEDURE — 72131 CT LUMBAR SPINE W/O DYE: CPT

## 2025-06-12 ENCOUNTER — CLINICAL SUPPORT NO REQUIREMENTS (OUTPATIENT)
Age: 75
End: 2025-06-12
Payer: MEDICARE

## 2025-06-12 ENCOUNTER — OFFICE VISIT (OUTPATIENT)
Age: 75
End: 2025-06-12
Payer: MEDICARE

## 2025-06-12 ENCOUNTER — TELEPHONE (OUTPATIENT)
Dept: CARDIOLOGY | Age: 75
End: 2025-06-12
Payer: MEDICARE

## 2025-06-12 VITALS
WEIGHT: 224 LBS | SYSTOLIC BLOOD PRESSURE: 134 MMHG | HEART RATE: 71 BPM | DIASTOLIC BLOOD PRESSURE: 60 MMHG | HEIGHT: 68 IN | BODY MASS INDEX: 33.95 KG/M2

## 2025-06-12 DIAGNOSIS — Z95.0 PRESENCE OF CARDIAC PACEMAKER: Primary | ICD-10-CM

## 2025-06-12 DIAGNOSIS — Z95.0 PRESENCE OF CARDIAC PACEMAKER: ICD-10-CM

## 2025-06-12 DIAGNOSIS — I44.2 COMPLETE HEART BLOCK: Primary | ICD-10-CM

## 2025-06-12 NOTE — TELEPHONE ENCOUNTER
Patient scheduled with Dr. Shell by mistake, this is a patient of Dr. Shetty. I called and left a voicemail for patient to get rescheduled for his 6 month follow up with TERR[facility]. Hub--if patient wants afternoon with TERRENCE, please transfer to Adilia in scheduling.

## 2025-06-12 NOTE — PROGRESS NOTES
Date of Office Visit: 2025  Encounter Provider: KAREN Suárez  Place of Service: [facility] CARDIOLOGY  Patient Name: Reginald Roth  :1950    Chief Complaint   Patient presents with    complete heart block    Pacemaker Check   :     HPI: Reginald Roth is a 75 y.o. male who follows with Dr. Shetty.    He presented to the emergency room in 2022 with dizziness and near syncope. He was found to be in complete heart block. He required placement of temporary pacemaker. During his admission, Dr. Shetty placed a dual chamber permanent pacemaker. He was discharged without complication.           He presents today for routine follow up and device check.     He is doing well from a cardiac standpoint. He has no complaints of dizziness, syncope, chest pain, or palpitations.     He has normal device testing and function today. Atrial pacing 68%, ventricular pacing 99%. AT/AF burden 0.1%. 8 years 2 months left on his device battery. He did not have an intrinsic underlying rhythm today, so he is fully dependent on his device.           Past Medical History:   Diagnosis Date    Acute congestive heart failure     Arthritis     Cancer colon    Cervical disc disorder     Complete heart block     Coronary artery disease     Headache     Hyperlipidemia     Hypertension     Low back pain     Lumbosacral disc disease     Peripheral neuropathy     Prostate disease     Pulmonary arterial hypertension     Sleep apnea     Third degree heart block     Thoracic disc disorder        Past Surgical History:   Procedure Laterality Date    CARDIAC ELECTROPHYSIOLOGY PROCEDURE N/A 2022    Procedure: Temporary Pacemaker;  Surgeon: Madelaine Calles MD;  Location:  [facility];  Service: Cardiology;  Laterality: N/A;    CARDIAC ELECTROPHYSIOLOGY PROCEDURE N/A 04/15/2022    Procedure: Pacemaker DC new;  Surgeon: Bola Shetty MD;  Location:  [facility]  "LOCATION;  Service: Cardiology;  Laterality: N/A;    COLON SURGERY      COLONOSCOPY      COLONOSCOPY N/A 7/19/2023    Procedure: COLONOSCOPY TO ILEOCOLIC ANASTOMOSIS;  Surgeon: Yossi Gimenez MD;  Location: [facility] ENDOSCOPY;  Service: General;  Laterality: N/A;  HX COLON CANCER/NORMAL POSTOP ANATOMY    INSERT / REPLACE / REMOVE PACEMAKER  4/15/2022    JOINT REPLACEMENT         Social History     Socioeconomic History    Marital status:    Tobacco Use    Smoking status: Never     Passive exposure: Never    Smokeless tobacco: Never   Vaping Use    Vaping status: Never Used   Substance and Sexual Activity    Alcohol use: Yes     Alcohol/week: 2.0 standard drinks of alcohol     Types: 2 Cans of beer per week    Drug use: Never    Sexual activity: Not Currently     Partners: Female       History reviewed. No pertinent family history.        No Known Allergies      Current Outpatient Medications:     amLODIPine (NORVASC) 5 MG tablet, Take 1 tablet by mouth Daily., Disp: , Rfl:     atorvastatin (LIPITOR) 20 MG tablet, Take 1 tablet by mouth 2 (Two) Times a Day., Disp: , Rfl:     carvedilol (COREG) 12.5 MG tablet, Take 1 tablet by mouth Every 12 (Twelve) Hours., Disp: 90 tablet, Rfl: 3    finasteride (PROSCAR) 5 MG tablet, Take 1 tablet by mouth Daily., Disp: , Rfl:     gabapentin (NEURONTIN) 300 MG capsule, Take 1 capsule by mouth 3 times a day., Disp: , Rfl:     glyburide (DIAbeta) 2.5 MG tablet, Take 1 tablet by mouth Daily With Breakfast., Disp: , Rfl:     hydroCHLOROthiazide (HYDRODIURIL) 25 MG tablet, Take 1 tablet by mouth Daily., Disp: , Rfl:     triamcinolone (KENALOG) 0.1 % cream, As Needed., Disp: , Rfl:     valsartan (DIOVAN) 160 MG tablet, Take 1 tablet by mouth Daily., Disp: , Rfl:       Objective:     Vitals:    06/12/25 1258   BP: 134/60   BP Location: Right arm   Patient Position: Sitting   Pulse: 71   Weight: 102 kg (224 lb)   Height: 172.7 cm (68\")     Body mass index is 34.06 kg/m².    PHYSICAL " EXAM:    Vitals Reviewed.   General Appearance: No acute distress, well developed and well nourished.   Respiratory: No signs of respiratory distress. Respiration rhythm and depth normal.   Cardiovascular:  Heart Rate and Rhythm: Normal  Skin: Warm and dry.   Psychiatric: Patient alert and oriented to person, place, and time. Speech and behavior appropriate. Normal mood and affect.       ECG 12 Lead    Date/Time: 6/12/2025 2:09 PM  Performed by: Wendy Mendieta APRN    Authorized by: Wendy Mendieta APRN  Comparison: compared with previous ECG   Similar to previous ECG  Rhythm: paced            Assessment:       Diagnosis Plan   1. Complete heart block        2. Presence of cardiac pacemaker               Plan:       1-2. Complete heart block s/p dual chamber PPM 4/2022  -- no complaints today, stable from a cardiac standpoint  -- normal device testing and function, AP 68%,  99%, AT/AF 0.1%  -- pacemaker dependent  -- 8 years 2 months left on battery        Routine follow up and device check with Dr. Sehtty in 6 months.           I spent at least 30 minutes reviewing previous notes, labs, EKGs, device reports and/or time with the patient.         As always, it has been a pleasure to participate in your patient's care.      Sincerely,         KAREN Henry

## 2025-06-20 ENCOUNTER — OFFICE VISIT (OUTPATIENT)
Dept: NEUROSURGERY | Facility: CLINIC | Age: 75
End: 2025-06-20
Payer: MEDICARE

## 2025-06-20 VITALS
HEART RATE: 84 BPM | HEIGHT: 68 IN | DIASTOLIC BLOOD PRESSURE: 64 MMHG | SYSTOLIC BLOOD PRESSURE: 152 MMHG | BODY MASS INDEX: 33.65 KG/M2 | WEIGHT: 222 LBS

## 2025-06-20 DIAGNOSIS — M47.816 LUMBAR SPONDYLOSIS: Primary | ICD-10-CM

## 2025-06-20 DIAGNOSIS — M48.061 FORAMINAL STENOSIS OF LUMBAR REGION: ICD-10-CM

## 2025-06-20 NOTE — PROGRESS NOTES
Patient being seen for today for Follow-up (CT Lumbar Spine Without Contrast completed on 6/10/2025) and Back Pain  .    Subjective    Reginald Roth is a 75 y.o. male that presents with Follow-up (CT Lumbar Spine Without Contrast completed on 6/10/2025) and Back Pain  .    HPI  Previously: Last seen on 5/23/2025 for pain in the back and right leg to the posterior.  He was reporting some benefit from pain management treatments with RFA's and epidurals but the recent epidural was not approved as an ongoing treatment and reportedly a neurosurgery evaluation was requested prior to continuing.  He had previous imaging from 2023 showing multilevel spondylosis but not major stenosis.  I do not expect physical therapy to be effective for him as he been doing home exercises for the past 6 months without any significant improvement in some exacerbation of his pain.  Considering the failure of this conservative treatment and his ongoing pain in the back and leg there was an order for new CT of the lumbar spine without contrast to evaluate further.  We discussed that I would recommend continuing RFA treatment with pain management while we evaluate further as he found that helpful.    Today: He reports worse pain in the lower back today after lifting his burn barrel yesterday.    He has some pain in the right > left leg today.    Otherwise he denies any new or changed complaints.    He reports Gabapentin helps the leg pain.     reports that he has never smoked. He has never been exposed to tobacco smoke. He has never used smokeless tobacco.    Review of Systems   Musculoskeletal:  Positive for back pain.       Objective   Vitals:    06/20/25 1141   BP: 152/64   Pulse: 84        Physical Exam  Constitutional:       Appearance: Normal appearance. He is obese.   Pulmonary:      Effort: Pulmonary effort is normal.   Musculoskeletal:         General: Tenderness (left > right, midline lumbar) present.      Comments: SLR worsens back  pain   Neurological:      General: No focal deficit present.      Mental Status: He is alert and oriented to person, place, and time.      Sensory: No sensory deficit.      Motor: No weakness.      Deep Tendon Reflexes: Reflexes normal.   Psychiatric:         Mood and Affect: Mood normal.         Behavior: Behavior normal.          Result Review   I have independently interpreted the CT of the lumbar spine without contrast from 6/10/2025 which shows multilevel lumbar spondylosis with mild to moderate foraminal stenosis on the right at L4-L5, on the left at L3-L4.  There is no high-grade stenosis otherwise.     Assessment and Plan {CC Problem List  Visit Diagnosis  ROS  Review (Popup)  [facility]  Quality  BestPractice  Medications  SmartSets  SnapShot Encounters  Media :23}   Diagnoses and all orders for this visit:    1. Lumbar spondylosis (Primary)    2. Foraminal stenosis of lumbar region    I do not see any significant stenosis in the lumbar spine for which I expect a neurosurgical approach to be indicated or helpful for.    Most of his pain is in the back today, and surgery typically would not help back pain.    I do think he should continue the conservative treatment which has been previously helpful, including lumbar RFA treatments and lumbar epidurals, for which he can follow-up with his pain management provider to discuss.    While he may consider PT, 6 months of home exercise were mostly ineffective and sometimes exacerbated his pain.    The patient was counseled on basic recommendations for the reduction and prevention of back, neck, or spine pain in association with spinal disorders, including: cessation/avoidance of nicotine use, maintenance of a healthy BMI and weight, focusing on building/maintaining core strength through core exercise, and avoidance of activities which worsen the pain. The patient will monitor for changes in symptoms and notify our clinic of these changes as  needed.  Follow Up {Instructions Charge Capture  Follow-up Communications :23}   Return if symptoms worsen or fail to improve.

## 2025-08-12 PROBLEM — M54.16 LUMBAR RADICULOPATHY: Status: RESOLVED | Noted: 2023-08-31 | Resolved: 2025-08-12

## 2025-08-12 PROBLEM — M54.16 LUMBAR RADICULOPATHY: Status: ACTIVE | Noted: 2023-08-31

## 2025-08-12 PROBLEM — M47.817 LUMBOSACRAL SPONDYLOSIS WITHOUT MYELOPATHY: Status: ACTIVE | Noted: 2025-03-05

## 2025-08-12 PROBLEM — N40.0 BENIGN PROSTATIC HYPERPLASIA WITHOUT LOWER URINARY TRACT SYMPTOMS: Status: ACTIVE | Noted: 2025-08-12

## 2025-08-12 PROBLEM — Z86.0101 HISTORY OF ADENOMATOUS POLYP OF COLON: Status: RESOLVED | Noted: 2023-03-23 | Resolved: 2025-08-12

## 2025-08-12 PROBLEM — Z85.038 PERSONAL HISTORY OF COLON CANCER: Status: RESOLVED | Noted: 2023-03-23 | Resolved: 2025-08-12

## 2025-08-12 PROBLEM — E78.2 MIXED HYPERLIPIDEMIA: Status: ACTIVE | Noted: 2025-08-12

## 2025-08-12 PROBLEM — M47.812 CERVICAL SPONDYLOSIS: Status: ACTIVE | Noted: 2024-04-10

## 2025-08-12 PROBLEM — G47.33 OSA (OBSTRUCTIVE SLEEP APNEA): Status: ACTIVE | Noted: 2023-02-08

## 2025-08-12 LAB
MC_CV_MDC_IDC_RATE_1: 150
MC_CV_MDC_IDC_RATE_1: 171
MC_CV_MDC_IDC_THERAPIES: NORMAL
MC_CV_MDC_IDC_ZONE_ID: 2
MC_CV_MDC_IDC_ZONE_ID: 6
MDC_IDC_MSMT_BATTERY_REMAINING_LONGEVITY: 97 MO
MDC_IDC_MSMT_BATTERY_RRT_TRIGGER: 2.62
MDC_IDC_MSMT_BATTERY_STATUS: NORMAL
MDC_IDC_MSMT_BATTERY_VOLTAGE: 2.99
MDC_IDC_MSMT_LEADCHNL_RA_DTM: NORMAL
MDC_IDC_MSMT_LEADCHNL_RA_IMPEDANCE_VALUE: 399
MDC_IDC_MSMT_LEADCHNL_RA_PACING_THRESHOLD_AMPLITUDE: 0.75
MDC_IDC_MSMT_LEADCHNL_RA_PACING_THRESHOLD_POLARITY: NORMAL
MDC_IDC_MSMT_LEADCHNL_RA_PACING_THRESHOLD_PULSEWIDTH: 0.4
MDC_IDC_MSMT_LEADCHNL_RA_SENSING_INTR_AMPL: 2
MDC_IDC_MSMT_LEADCHNL_RV_DTM: NORMAL
MDC_IDC_MSMT_LEADCHNL_RV_IMPEDANCE_VALUE: 494
MDC_IDC_MSMT_LEADCHNL_RV_PACING_THRESHOLD_AMPLITUDE: 1
MDC_IDC_MSMT_LEADCHNL_RV_PACING_THRESHOLD_POLARITY: NORMAL
MDC_IDC_MSMT_LEADCHNL_RV_PACING_THRESHOLD_PULSEWIDTH: 0.4
MDC_IDC_MSMT_LEADCHNL_RV_SENSING_INTR_AMPL: 12.5
MDC_IDC_PG_IMPLANT_DTM: NORMAL
MDC_IDC_PG_MFG: NORMAL
MDC_IDC_PG_MODEL: NORMAL
MDC_IDC_PG_SERIAL: NORMAL
MDC_IDC_PG_TYPE: NORMAL
MDC_IDC_SESS_DTM: NORMAL
MDC_IDC_SESS_TYPE: NORMAL
MDC_IDC_SET_BRADY_AT_MODE_SWITCH_RATE: 171
MDC_IDC_SET_BRADY_LOWRATE: 60
MDC_IDC_SET_BRADY_MAX_SENSOR_RATE: 130
MDC_IDC_SET_BRADY_MAX_TRACKING_RATE: 130
MDC_IDC_SET_BRADY_MODE: NORMAL
MDC_IDC_SET_BRADY_PAV_DELAY: 150
MDC_IDC_SET_BRADY_SAV_DELAY: 120
MDC_IDC_SET_LEADCHNL_RA_PACING_AMPLITUDE: 2
MDC_IDC_SET_LEADCHNL_RA_PACING_POLARITY: NORMAL
MDC_IDC_SET_LEADCHNL_RA_PACING_PULSEWIDTH: 0.4
MDC_IDC_SET_LEADCHNL_RA_SENSING_POLARITY: NORMAL
MDC_IDC_SET_LEADCHNL_RA_SENSING_SENSITIVITY: 0.3
MDC_IDC_SET_LEADCHNL_RV_PACING_AMPLITUDE: 2
MDC_IDC_SET_LEADCHNL_RV_PACING_POLARITY: NORMAL
MDC_IDC_SET_LEADCHNL_RV_PACING_PULSEWIDTH: 0.4
MDC_IDC_SET_LEADCHNL_RV_SENSING_POLARITY: NORMAL
MDC_IDC_SET_LEADCHNL_RV_SENSING_SENSITIVITY: 0.9
MDC_IDC_SET_ZONE_STATUS: NORMAL
MDC_IDC_SET_ZONE_STATUS: NORMAL
MDC_IDC_SET_ZONE_TYPE: NORMAL
MDC_IDC_SET_ZONE_TYPE: NORMAL
MDC_IDC_STAT_AT_BURDEN_PERCENT: 0
MDC_IDC_STAT_BRADY_RA_PERCENT_PACED: 78.25
MDC_IDC_STAT_BRADY_RV_PERCENT_PACED: 98.51

## 2025-08-13 ENCOUNTER — OFFICE VISIT (OUTPATIENT)
Dept: FAMILY MEDICINE CLINIC | Age: 75
End: 2025-08-13
Payer: MEDICARE

## 2025-08-13 VITALS
SYSTOLIC BLOOD PRESSURE: 145 MMHG | BODY MASS INDEX: 33.86 KG/M2 | HEART RATE: 76 BPM | WEIGHT: 223.4 LBS | OXYGEN SATURATION: 98 % | HEIGHT: 68 IN | TEMPERATURE: 98.4 F | DIASTOLIC BLOOD PRESSURE: 80 MMHG

## 2025-08-13 DIAGNOSIS — N40.0 BENIGN PROSTATIC HYPERPLASIA WITHOUT LOWER URINARY TRACT SYMPTOMS: ICD-10-CM

## 2025-08-13 DIAGNOSIS — E78.2 MIXED HYPERLIPIDEMIA: ICD-10-CM

## 2025-08-13 DIAGNOSIS — M47.817 LUMBOSACRAL SPONDYLOSIS WITHOUT MYELOPATHY: ICD-10-CM

## 2025-08-13 DIAGNOSIS — I10 PRIMARY HYPERTENSION: ICD-10-CM

## 2025-08-13 DIAGNOSIS — Z12.5 PROSTATE CANCER SCREENING: ICD-10-CM

## 2025-08-13 DIAGNOSIS — L23.9 ALLERGIC CONTACT DERMATITIS, UNSPECIFIED TRIGGER: ICD-10-CM

## 2025-08-13 DIAGNOSIS — Z29.11 NEED FOR RSV IMMUNIZATION: ICD-10-CM

## 2025-08-13 DIAGNOSIS — Z11.59 NEED FOR HEPATITIS C SCREENING TEST: ICD-10-CM

## 2025-08-13 DIAGNOSIS — E11.42 TYPE 2 DIABETES MELLITUS WITH DIABETIC POLYNEUROPATHY, WITHOUT LONG-TERM CURRENT USE OF INSULIN: Primary | ICD-10-CM

## 2025-08-13 LAB
ALBUMIN UR-MCNC: <1.2 MG/DL
CREAT UR-MCNC: 16.3 MG/DL
MICROALBUMIN/CREAT UR: NORMAL MG/G{CREAT}

## 2025-08-13 PROCEDURE — 82570 ASSAY OF URINE CREATININE: CPT | Performed by: EMERGENCY MEDICINE

## 2025-08-13 PROCEDURE — 82043 UR ALBUMIN QUANTITATIVE: CPT | Performed by: EMERGENCY MEDICINE

## 2025-08-13 RX ORDER — FINASTERIDE 5 MG/1
5 TABLET, FILM COATED ORAL DAILY
Qty: 90 TABLET | Refills: 1 | Status: SHIPPED | OUTPATIENT
Start: 2025-08-13

## 2025-08-13 RX ORDER — TRIAMCINOLONE ACETONIDE 1 MG/G
CREAM TOPICAL 2 TIMES DAILY
Qty: 453 G | Refills: 2 | Status: SHIPPED | OUTPATIENT
Start: 2025-08-13

## 2025-08-13 RX ORDER — ANTIARTHRITIC COMBINATION NO.2 900 MG
TABLET ORAL
COMMUNITY

## 2025-08-13 RX ORDER — AMLODIPINE BESYLATE 5 MG/1
5 TABLET ORAL DAILY
Qty: 90 TABLET | Refills: 1 | Status: SHIPPED | OUTPATIENT
Start: 2025-08-13

## 2025-08-13 RX ORDER — THIAMINE HCL 50 MG
50 TABLET ORAL DAILY
COMMUNITY

## 2025-08-13 RX ORDER — CARVEDILOL 12.5 MG/1
12.5 TABLET ORAL EVERY 12 HOURS SCHEDULED
Qty: 90 TABLET | Refills: 1 | Status: SHIPPED | OUTPATIENT
Start: 2025-08-13

## 2025-08-13 RX ORDER — HYDROCHLOROTHIAZIDE 25 MG/1
25 TABLET ORAL DAILY
Qty: 90 TABLET | Refills: 1 | Status: SHIPPED | OUTPATIENT
Start: 2025-08-13

## 2025-08-13 RX ORDER — ATORVASTATIN CALCIUM 20 MG/1
20 TABLET, FILM COATED ORAL 2 TIMES DAILY
Qty: 180 TABLET | Refills: 1 | Status: SHIPPED | OUTPATIENT
Start: 2025-08-13

## 2025-08-13 RX ORDER — VALSARTAN 160 MG/1
160 TABLET ORAL DAILY
Qty: 90 TABLET | Refills: 1 | Status: SHIPPED | OUTPATIENT
Start: 2025-08-13

## 2025-08-13 RX ORDER — MAGNESIUM GLUCONATE 27 MG(500)
500 TABLET ORAL 2 TIMES DAILY
COMMUNITY

## 2025-08-13 RX ORDER — GABAPENTIN 300 MG/1
300 CAPSULE ORAL 3 TIMES DAILY
Qty: 90 CAPSULE | Refills: 3 | Status: SHIPPED | OUTPATIENT
Start: 2025-08-13

## 2025-08-19 ENCOUNTER — LAB (OUTPATIENT)
Dept: LAB | Facility: HOSPITAL | Age: 75
End: 2025-08-19
Payer: MEDICARE

## 2025-08-19 DIAGNOSIS — E11.42 TYPE 2 DIABETES MELLITUS WITH DIABETIC POLYNEUROPATHY, WITHOUT LONG-TERM CURRENT USE OF INSULIN: ICD-10-CM

## 2025-08-19 DIAGNOSIS — Z11.59 NEED FOR HEPATITIS C SCREENING TEST: ICD-10-CM

## 2025-08-19 DIAGNOSIS — Z12.5 PROSTATE CANCER SCREENING: ICD-10-CM

## 2025-08-19 DIAGNOSIS — E78.2 MIXED HYPERLIPIDEMIA: ICD-10-CM

## 2025-08-19 LAB
ALBUMIN SERPL-MCNC: 4.1 G/DL (ref 3.5–5.2)
ALBUMIN/GLOB SERPL: 1.3 G/DL
ALP SERPL-CCNC: 72 U/L (ref 39–117)
ALT SERPL W P-5'-P-CCNC: 34 U/L (ref 1–41)
ANION GAP SERPL CALCULATED.3IONS-SCNC: 12.2 MMOL/L (ref 5–15)
AST SERPL-CCNC: 29 U/L (ref 1–40)
BILIRUB SERPL-MCNC: 0.3 MG/DL (ref 0–1.2)
BUN SERPL-MCNC: 16 MG/DL (ref 8–23)
BUN/CREAT SERPL: 19.5 (ref 7–25)
CALCIUM SPEC-SCNC: 10 MG/DL (ref 8.6–10.5)
CHLORIDE SERPL-SCNC: 102 MMOL/L (ref 98–107)
CHOLEST SERPL-MCNC: 144 MG/DL (ref 0–200)
CO2 SERPL-SCNC: 24.8 MMOL/L (ref 22–29)
CREAT SERPL-MCNC: 0.82 MG/DL (ref 0.76–1.27)
EGFRCR SERPLBLD CKD-EPI 2021: 91.6 ML/MIN/1.73
GLOBULIN UR ELPH-MCNC: 3.2 GM/DL
GLUCOSE SERPL-MCNC: 158 MG/DL (ref 65–99)
HBA1C MFR BLD: 6.8 % (ref 4.8–5.6)
HCV AB SER QL: NORMAL
HDLC SERPL-MCNC: 44 MG/DL (ref 40–60)
LDLC SERPL CALC-MCNC: 32 MG/DL (ref 0–100)
LDLC/HDLC SERPL: 0.07 {RATIO}
POTASSIUM SERPL-SCNC: 4.1 MMOL/L (ref 3.5–5.2)
PROT SERPL-MCNC: 7.3 G/DL (ref 6–8.5)
PSA SERPL-MCNC: <0.014 NG/ML (ref 0–4)
SODIUM SERPL-SCNC: 139 MMOL/L (ref 136–145)
TRIGL SERPL-MCNC: 485 MG/DL (ref 0–150)
VLDLC SERPL-MCNC: 68 MG/DL (ref 5–40)

## 2025-08-19 PROCEDURE — 83036 HEMOGLOBIN GLYCOSYLATED A1C: CPT

## 2025-08-19 PROCEDURE — 86803 HEPATITIS C AB TEST: CPT

## 2025-08-19 PROCEDURE — G0103 PSA SCREENING: HCPCS

## 2025-08-19 PROCEDURE — 36415 COLL VENOUS BLD VENIPUNCTURE: CPT

## 2025-08-19 PROCEDURE — 80053 COMPREHEN METABOLIC PANEL: CPT

## 2025-08-19 PROCEDURE — 80061 LIPID PANEL: CPT

## 2025-08-22 LAB
MC_CV_MDC_IDC_RATE_1: 150
MC_CV_MDC_IDC_RATE_1: 171
MC_CV_MDC_IDC_THERAPIES: NORMAL
MC_CV_MDC_IDC_ZONE_ID: 2
MC_CV_MDC_IDC_ZONE_ID: 6
MDC_IDC_MSMT_BATTERY_REMAINING_LONGEVITY: 97 MO
MDC_IDC_MSMT_BATTERY_RRT_TRIGGER: 2.62
MDC_IDC_MSMT_BATTERY_STATUS: NORMAL
MDC_IDC_MSMT_BATTERY_VOLTAGE: 2.99
MDC_IDC_MSMT_LEADCHNL_RA_DTM: NORMAL
MDC_IDC_MSMT_LEADCHNL_RA_IMPEDANCE_VALUE: 399
MDC_IDC_MSMT_LEADCHNL_RA_PACING_THRESHOLD_AMPLITUDE: 0.75
MDC_IDC_MSMT_LEADCHNL_RA_PACING_THRESHOLD_POLARITY: NORMAL
MDC_IDC_MSMT_LEADCHNL_RA_PACING_THRESHOLD_PULSEWIDTH: 0.4
MDC_IDC_MSMT_LEADCHNL_RA_SENSING_INTR_AMPL: 2
MDC_IDC_MSMT_LEADCHNL_RV_DTM: NORMAL
MDC_IDC_MSMT_LEADCHNL_RV_IMPEDANCE_VALUE: 494
MDC_IDC_MSMT_LEADCHNL_RV_PACING_THRESHOLD_AMPLITUDE: 1
MDC_IDC_MSMT_LEADCHNL_RV_PACING_THRESHOLD_POLARITY: NORMAL
MDC_IDC_MSMT_LEADCHNL_RV_PACING_THRESHOLD_PULSEWIDTH: 0.4
MDC_IDC_MSMT_LEADCHNL_RV_SENSING_INTR_AMPL: 12.5
MDC_IDC_PG_IMPLANT_DTM: NORMAL
MDC_IDC_PG_MFG: NORMAL
MDC_IDC_PG_MODEL: NORMAL
MDC_IDC_PG_SERIAL: NORMAL
MDC_IDC_PG_TYPE: NORMAL
MDC_IDC_SESS_DTM: NORMAL
MDC_IDC_SESS_TYPE: NORMAL
MDC_IDC_SET_BRADY_AT_MODE_SWITCH_RATE: 171
MDC_IDC_SET_BRADY_LOWRATE: 60
MDC_IDC_SET_BRADY_MAX_SENSOR_RATE: 130
MDC_IDC_SET_BRADY_MAX_TRACKING_RATE: 130
MDC_IDC_SET_BRADY_MODE: NORMAL
MDC_IDC_SET_BRADY_PAV_DELAY: 150
MDC_IDC_SET_BRADY_SAV_DELAY: 120
MDC_IDC_SET_LEADCHNL_RA_PACING_AMPLITUDE: 2
MDC_IDC_SET_LEADCHNL_RA_PACING_POLARITY: NORMAL
MDC_IDC_SET_LEADCHNL_RA_PACING_PULSEWIDTH: 0.4
MDC_IDC_SET_LEADCHNL_RA_SENSING_POLARITY: NORMAL
MDC_IDC_SET_LEADCHNL_RA_SENSING_SENSITIVITY: 0.3
MDC_IDC_SET_LEADCHNL_RV_PACING_AMPLITUDE: 2
MDC_IDC_SET_LEADCHNL_RV_PACING_POLARITY: NORMAL
MDC_IDC_SET_LEADCHNL_RV_PACING_PULSEWIDTH: 0.4
MDC_IDC_SET_LEADCHNL_RV_SENSING_POLARITY: NORMAL
MDC_IDC_SET_LEADCHNL_RV_SENSING_SENSITIVITY: 0.9
MDC_IDC_SET_ZONE_STATUS: NORMAL
MDC_IDC_SET_ZONE_STATUS: NORMAL
MDC_IDC_SET_ZONE_TYPE: NORMAL
MDC_IDC_SET_ZONE_TYPE: NORMAL
MDC_IDC_STAT_AT_BURDEN_PERCENT: 0
MDC_IDC_STAT_BRADY_RA_PERCENT_PACED: 78.25
MDC_IDC_STAT_BRADY_RV_PERCENT_PACED: 98.51

## (undated) DEVICE — LOU PACE DEFIB: Brand: MEDLINE INDUSTRIES, INC.

## (undated) DEVICE — SLITTER CATH GUIDE ATTAIN ADJ

## (undated) DEVICE — TBG PENCL TELESCP MEGADYNE SMOKE EVAC 10FT

## (undated) DEVICE — SENSR O2 OXIMAX FNGR A/ 18IN NONSTR

## (undated) DEVICE — KT ORCA ORCAPOD DISP STRL

## (undated) DEVICE — PK CATH CARD 40

## (undated) DEVICE — CATH GUIDE RIGHTSITE C315HIS-02

## (undated) DEVICE — LN SMPL CO2 SHTRM SD STREAM W/M LUER

## (undated) DEVICE — INTRO SHEATH PRELUDE SNAP .038 7F 13CM W/SDPRT

## (undated) DEVICE — CATH ELECTRD PACE TEMP BI NONHEP 5F110CM

## (undated) DEVICE — CANN O2 ETCO2 FITS ALL CONN CO2 SMPL A/ 7IN DISP LF

## (undated) DEVICE — INTRO SHEATH PRELUDE SNAP .038 9F 13CM W/SDPRT BLK

## (undated) DEVICE — TRY INTRO PERC 6F

## (undated) DEVICE — TUBING, SUCTION, 1/4" X 10', STRAIGHT: Brand: MEDLINE

## (undated) DEVICE — ADAPT CLN BIOGUARD AIR/H2O DISP

## (undated) DEVICE — Device